# Patient Record
Sex: FEMALE | Race: WHITE | NOT HISPANIC OR LATINO | URBAN - METROPOLITAN AREA
[De-identification: names, ages, dates, MRNs, and addresses within clinical notes are randomized per-mention and may not be internally consistent; named-entity substitution may affect disease eponyms.]

---

## 2018-09-18 PROBLEM — Z00.00 ENCOUNTER FOR PREVENTIVE HEALTH EXAMINATION: Status: ACTIVE | Noted: 2018-09-18

## 2018-11-20 ENCOUNTER — OUTPATIENT (OUTPATIENT)
Dept: OUTPATIENT SERVICES | Facility: HOSPITAL | Age: 58
LOS: 1 days | Discharge: HOME | End: 2018-11-20

## 2018-11-20 DIAGNOSIS — D68.9 COAGULATION DEFECT, UNSPECIFIED: ICD-10-CM

## 2018-11-27 ENCOUNTER — APPOINTMENT (OUTPATIENT)
Dept: HEMATOLOGY ONCOLOGY | Facility: CLINIC | Age: 58
End: 2018-11-27
Payer: COMMERCIAL

## 2018-11-27 DIAGNOSIS — Z80.8 FAMILY HISTORY OF MALIGNANT NEOPLASM OF OTHER ORGANS OR SYSTEMS: ICD-10-CM

## 2018-11-27 DIAGNOSIS — E03.9 HYPOTHYROIDISM, UNSPECIFIED: ICD-10-CM

## 2018-11-27 DIAGNOSIS — L68.0 HIRSUTISM: ICD-10-CM

## 2018-11-27 DIAGNOSIS — G37.9 DEMYELINATING DISEASE OF CENTRAL NERVOUS SYSTEM, UNSPECIFIED: ICD-10-CM

## 2018-11-27 DIAGNOSIS — I63.81 OTHER CEREBRAL INFARCTION DUE TO OCCLUSION OR STENOSIS OF SMALL ARTERY: ICD-10-CM

## 2018-11-27 PROCEDURE — 99243 OFF/OP CNSLTJ NEW/EST LOW 30: CPT

## 2018-12-19 PROBLEM — L68.0 HIRSUTISM: Status: ACTIVE | Noted: 2018-12-19

## 2018-12-19 PROBLEM — G37.9 DEMYELINATING DISEASE: Status: ACTIVE | Noted: 2018-12-19

## 2018-12-19 PROBLEM — Z80.8 FAMILY HISTORY OF MALIGNANT NEOPLASM OF ORAL CAVITY: Status: ACTIVE | Noted: 2018-12-19

## 2018-12-19 PROBLEM — E03.9 HYPOTHYROIDISM: Status: ACTIVE | Noted: 2018-12-19

## 2018-12-19 RX ORDER — MULTIVIT-MIN/IRON/FOLIC ACID/K 18-600-40
500 CAPSULE ORAL
Refills: 0 | Status: ACTIVE | COMMUNITY
Start: 2018-12-19

## 2018-12-19 RX ORDER — MULTIVITAMIN
CAPSULE ORAL
Refills: 0 | Status: ACTIVE | COMMUNITY
Start: 2018-12-19

## 2018-12-19 RX ORDER — B-COMPLEX WITH VITAMIN C
100 TABLET ORAL
Refills: 0 | Status: ACTIVE | COMMUNITY
Start: 2018-12-19

## 2018-12-19 RX ORDER — LORATADINE/PSEUDOEPHEDRINE 5 MG-120MG
10 TABLET, EXTENDED RELEASE 12 HR ORAL
Refills: 0 | Status: ACTIVE | COMMUNITY
Start: 2018-12-19

## 2018-12-19 NOTE — HISTORY OF PRESENT ILLNESS
[de-identified] : Lacunar infarct [de-identified] : 59 YO F with demyelinating disease and a stroke which occurred at least 8  years ago referred for evaluation of possible coagulopathy. Eight years ago, she was found to have demyelinating disease and a lacunar infarct of unknown age was found on MRI. \par \par She reports frequent headaches, more on the left. She is dizzy upon arising, She is fatigued. She has occasional vague chest pain when nervous or with exertion. It lasts 1-5 minutes and can be accompanied by SOB. She notes no nausea, diaphoresis, nor radiation. She has no dyspnea on exertion, limb pain nor swelling, rashes, arthritis, palpitations, TIA's.. \par \par She has no personal nor family history of deep venous thrombophlebitis nor pulmonary embolism. She is not on any anticoagulation.

## 2018-12-19 NOTE — REVIEW OF SYSTEMS
[Negative] : Allergic/Immunologic [Chest Pain] : chest pain [Shortness Of Breath] : shortness of breath [Easy Bruising] : a tendency for easy bruising [Fatigue] : fatigue [Dizziness] : dizziness [de-identified] : HA, right hand tingles

## 2018-12-19 NOTE — ASSESSMENT
[Palliative Care Plan] : not applicable at this time [FreeTextEntry1] : 57 YO F with demyelinating disease and incidentally detected lacunar infarct of unknown age. She is not anticoagulated at this time. She has had no further strokes in the 8 years since the lacunar infarct was discovered.  It is highly  unlikely that an underlying coagulopathy was the etiology of her lacunar infarct.\par \par Plan: \par CBC, PT, APTT, lupus anticoagulant panel, homocysteine, PI-linked antigen, DIC screen\par Call me in 2 weeks\par \par

## 2018-12-19 NOTE — ADDENDUM
[FreeTextEntry1] : Documented by Iraj Ibanez acting as a scribe for Dr. Chris Alexander on 11/27/18\par \par All medical record entries made by the Scribe were at my, Dr. Chris Alexander's, direction and personally dictated by me on 11/27/18. I have reviewed the chart and agree that the record accurately reflects my personal performance of the history, physical exam, procedure and imaging

## 2019-03-20 ENCOUNTER — APPOINTMENT (OUTPATIENT)
Dept: ENDOCRINOLOGY | Facility: CLINIC | Age: 59
End: 2019-03-20
Payer: COMMERCIAL

## 2019-03-20 VITALS
HEART RATE: 74 BPM | SYSTOLIC BLOOD PRESSURE: 122 MMHG | DIASTOLIC BLOOD PRESSURE: 60 MMHG | HEIGHT: 62 IN | BODY MASS INDEX: 23 KG/M2 | WEIGHT: 125 LBS

## 2019-03-20 DIAGNOSIS — E27.0 OTHER ADRENOCORTICAL OVERACTIVITY: ICD-10-CM

## 2019-03-20 DIAGNOSIS — R79.89 OTHER SPECIFIED ABNORMAL FINDINGS OF BLOOD CHEMISTRY: ICD-10-CM

## 2019-03-20 PROCEDURE — 99205 OFFICE O/P NEW HI 60 MIN: CPT

## 2019-03-20 NOTE — HISTORY OF PRESENT ILLNESS
[FreeTextEntry1] : Pt. concerned about thyroid status due to sporadic TSH elevations up to 9 (never treated) and isolated elevated cortisol level of 14 in the afternoon.\par Multiple complaints:\par brain fog, decreased concentration, fatigue, slow thinking, decreased memory, cold intolerance and depression. Present for years, worse lately. CHronic stress (son ill).\par Poor sleeping due to nocturia.\par \par PMH:\par ?lacunar infarct\par demyelinating disease\par anaphylaxis due to ovcon

## 2019-03-20 NOTE — PHYSICAL EXAM
[Well Nourished] : well nourished [Well Developed] : well developed [EOMI] : extra ocular movement intact [Thyroid Not Enlarged] : the thyroid was not enlarged [No Proptosis] : no proptosis [No Thyroid Nodules] : there were no palpable thyroid nodules [No Accessory Muscle Use] : no accessory muscle use [Clear to Auscultation] : lungs were clear to auscultation bilaterally [Normal S1, S2] : normal S1 and S2 [Regular Rhythm] : with a regular rhythm [No Stigmata of Cushings Syndrome] : no stigmata of cushings syndrome [No Clubbing, Cyanosis] : no clubbing  or cyanosis of the fingernails [Normal Strength/Tone] : muscle strength and tone were normal [No Motor Deficits] : the motor exam was normal [No Tremors] : no tremors [Normal Insight/Judgement] : insight and judgment were intact [Normal Affect] : the affect was normal [Normal Mood] : the mood was normal [Recent Memory Normal] : recent memory was not impaired [de-identified] : warm and moist

## 2019-03-20 NOTE — REVIEW OF SYSTEMS
[Fatigue] : fatigue [Blurry Vision] : blurred vision [Chest Pain] : chest pain [Shortness Of Breath] : shortness of breath [Polyuria] : polyuria [Nocturia] : nocturia [Muscle Weakness] : muscle weakness [Myalgia] : myalgia  [Back Pain] : back pain [Hair Loss] : hair loss [Depression] : depression [Dry Skin] : dry skin [Anxiety] : anxiety [Stress] : stress [All other systems negative] : All other systems negative [de-identified] : decreased memory

## 2019-03-20 NOTE — ASSESSMENT
[FreeTextEntry1] : 1. Possible subclinical hypothyroidism, although antibodies negative and no thyroid enlargement on exam. Doubt relation to symptoms, but cannot rule out exacerbation of symptoms by thyroid status. Will repeat thyroid function and consider a therapeutic trial if TSH in the 5-10 range.\par 2. No clinical evidence of Cushing's syndrome. Will check 24 hour urine cortisol. (Pt. uncomfortable about taking dexamethasone in view of prior drug allergies)\par 3. Discussed other factors such as stress and sleep disturbance which are much more likely to cause symptoms. Unable to fully reassure pt. of this. Also discussed psychological testing to rule out cognitive impairment, which is not immediately obvious.

## 2020-02-26 ENCOUNTER — APPOINTMENT (OUTPATIENT)
Dept: OBGYN | Facility: CLINIC | Age: 60
End: 2020-02-26

## 2020-05-08 ENCOUNTER — APPOINTMENT (OUTPATIENT)
Dept: DERMATOLOGY | Facility: CLINIC | Age: 60
End: 2020-05-08
Payer: COMMERCIAL

## 2020-05-08 DIAGNOSIS — B35.1 TINEA UNGUIUM: ICD-10-CM

## 2020-05-08 DIAGNOSIS — L70.0 ACNE VULGARIS: ICD-10-CM

## 2020-05-08 DIAGNOSIS — L73.9 FOLLICULAR DISORDER, UNSPECIFIED: ICD-10-CM

## 2020-05-08 PROCEDURE — 99213 OFFICE O/P EST LOW 20 MIN: CPT | Mod: 95

## 2020-05-08 NOTE — PHYSICAL EXAM
[Alert] : alert [Well Nourished] : well nourished [Oriented x 3] : ~L oriented x 3 [FreeTextEntry3] : Photos examined and patient seen via live interaction over the computer\par \par Face:  rare excoriations on chin\par Buttocks:  rare papules\par Toenails:  mild distal onycholysis, especially the left fifth toenail

## 2020-05-08 NOTE — ASSESSMENT
[FreeTextEntry1] : Acne vulgaris with excoriations on face\par Folliculitis on buttocks\par Onychomycosis on toenails\par \par All under good control

## 2020-10-28 ENCOUNTER — OFFICE VISIT (OUTPATIENT)
Dept: URBAN - METROPOLITAN AREA CLINIC 105 | Facility: CLINIC | Age: 60
End: 2020-10-28
Payer: MEDICARE

## 2020-10-28 DIAGNOSIS — E66.9 OBESITY (BMI 30-39.9): ICD-10-CM

## 2020-10-28 DIAGNOSIS — Z86.010 PERSONAL HISTORY OF COLONIC POLYPS: ICD-10-CM

## 2020-10-28 DIAGNOSIS — D50.0 IRON DEFICIENCY ANEMIA DUE TO CHRONIC BLOOD LOSS: ICD-10-CM

## 2020-10-28 DIAGNOSIS — K55.20 AVM (ARTERIOVENOUS MALFORMATION) OF SMALL BOWEL, ACQUIRED: ICD-10-CM

## 2020-10-28 PROCEDURE — G8417 CALC BMI ABV UP PARAM F/U: HCPCS | Performed by: INTERNAL MEDICINE

## 2020-10-28 PROCEDURE — G8427 DOCREV CUR MEDS BY ELIG CLIN: HCPCS | Performed by: INTERNAL MEDICINE

## 2020-10-28 PROCEDURE — G9903 PT SCRN TBCO ID AS NON USER: HCPCS | Performed by: INTERNAL MEDICINE

## 2020-10-28 PROCEDURE — 99214 OFFICE O/P EST MOD 30 MIN: CPT | Performed by: INTERNAL MEDICINE

## 2020-10-28 PROCEDURE — G8482 FLU IMMUNIZE ORDER/ADMIN: HCPCS | Performed by: INTERNAL MEDICINE

## 2020-10-28 PROCEDURE — 3017F COLORECTAL CA SCREEN DOC REV: CPT | Performed by: INTERNAL MEDICINE

## 2020-10-28 RX ORDER — IBUPROFEN 400 MG/1
TAKE 1 TABLET (400 MG) BY ORAL ROUTE AS NEEDED WITH FOOD TABLET ORAL
Qty: 0 | Refills: 0 | Status: ACTIVE | COMMUNITY
Start: 1900-01-01

## 2020-10-28 RX ORDER — EZETIMIBE 10 MG/1
TAKE 1 TABLET (10 MG) BY ORAL ROUTE ONCE DAILY TABLET ORAL 1
Qty: 0 | Refills: 0 | Status: ACTIVE | COMMUNITY
Start: 1900-01-01

## 2020-10-28 RX ORDER — PROMETHAZINE HYDROCHLORIDE 25 MG/1
AS NEED FOR NAUSEA TABLET ORAL
Qty: 0 | Refills: 0 | Status: ACTIVE | COMMUNITY
Start: 1900-01-01

## 2020-10-28 RX ORDER — FLUOXETINE 20 MG/1
TAKE 1 CAPSULE (20 MG) BY ORAL ROUTE ONCE DAILY CAPSULE ORAL 1
Qty: 0 | Refills: 0 | Status: ACTIVE | COMMUNITY
Start: 1900-01-01

## 2020-10-28 RX ORDER — ANASTROZOLE 1 MG/1
TAKE 1 TABLET (1 MG) BY ORAL ROUTE ONCE DAILY TABLET ORAL 1
Qty: 0 | Refills: 0 | Status: ACTIVE | COMMUNITY
Start: 1900-01-01

## 2020-10-28 RX ORDER — QUETIAPINE FUMARATE 25 MG/1
AS NEEDED TABLET, FILM COATED ORAL
Qty: 0 | Refills: 0 | Status: ACTIVE | COMMUNITY
Start: 1900-01-01

## 2020-10-28 RX ORDER — PANTOPRAZOLE SODIUM 40 MG/1
TAKE 1 TABLET (40 MG) BY ORAL ROUTE ONCE DAILY TABLET, DELAYED RELEASE ORAL 1
Qty: 0 | Refills: 0 | Status: ACTIVE | COMMUNITY
Start: 1900-01-01

## 2020-10-28 RX ORDER — ACETAMINOPHEN 325 MG/1
TABLET, FILM COATED ORAL
Qty: 0 | Refills: 0 | Status: ACTIVE | COMMUNITY
Start: 1900-01-01

## 2020-10-28 RX ORDER — OMEPRAZOLE 40 MG/1
TAKE 1 CAPSULE (40 MG) BY ORAL ROUTE ONCE DAILY BEFORE A MEAL CAPSULE, DELAYED RELEASE PELLETS ORAL 1
Qty: 0 | Refills: 0 | Status: ACTIVE | COMMUNITY
Start: 1900-01-01

## 2020-10-28 RX ORDER — MELOXICAM 15 MG/1
TAKE 1 TABLET (15 MG) BY ORAL ROUTE ONCE DAILY TABLET ORAL 1
Qty: 0 | Refills: 0 | Status: ACTIVE | COMMUNITY
Start: 1900-01-01

## 2020-10-28 RX ORDER — METFORMIN HYDROCHLORIDE 500 MG/1
TAKE 1 TABLET (500 MG) BY ORAL ROUTE 1 TIME PER DAY WITH MORNING AND EVENING MEALS TABLET, COATED ORAL 2
Qty: 0 | Refills: 0 | Status: ACTIVE | COMMUNITY
Start: 1900-01-01

## 2020-10-28 NOTE — HPI-TODAY'S VISIT:
The patient is an /White female who presents on follow-up for iron deficiency anemia.  On 8/14/19, the patient stated that while she was having chemo for breast cancer, her hemoglobin dropped and she had two iron infusions. Her last infusion was in February. She finished chemo in March and her oncologist Dr. Rodriguez had referred her because of her iron deficiency anemia/drop in H/H. She denied seeing blood in her BMs. She denied NSAID uses and she was not currently taking oral iron.   She had previously been followed by Dr. Guzman and by Dr. Castro. She underwent an EGD/small bowel capsule study in 2015 and was noted to have small bowel AVMs.  She had had previous colonoscopies in 2013 and 2010. Iron supplementation was recommended at that time.  I did not have any records from Dr. Rodriguez to review today.  Today, she says she had an episode of diarrhea after eating a blueberry salad with walnuts and parmesan. She thought she saw blood in her stool, but it was actually a piece of blueberry. She is off iron. She is on Meloxicam QOD. She had recent labwork with her PCP.  Labs 8/14/19 - Iron sat 13%, ferritin 693. CBC normal. 3/5/18 - CBC normal, Iron sat 10%, Ferritin 308.

## 2020-10-29 ENCOUNTER — TELEPHONE ENCOUNTER (OUTPATIENT)
Dept: URBAN - METROPOLITAN AREA CLINIC 92 | Facility: CLINIC | Age: 60
End: 2020-10-29

## 2020-10-29 LAB
BASO (ABSOLUTE): 0
BASOS: 0
EOS (ABSOLUTE): 0.2
EOS: 3
FERRITIN, SERUM: 537
HEMATOCRIT: 34.2
HEMATOLOGY COMMENTS:: (no result)
HEMOGLOBIN: 11
IMMATURE CELLS: (no result)
IMMATURE GRANS (ABS): 0.1
IMMATURE GRANULOCYTES: 1
IRON BIND.CAP.(TIBC): 256
IRON SATURATION: 12
IRON: 31
LYMPHS (ABSOLUTE): 2.1
LYMPHS: 30
MCH: 28.5
MCHC: 32.2
MCV: 89
MONOCYTES(ABSOLUTE): 0.6
MONOCYTES: 8
NEUTROPHILS (ABSOLUTE): 4.1
NEUTROPHILS: 58
NRBC: (no result)
PLATELETS: 286
RBC: 3.86
RDW: 13.2
UIBC: 225
WBC: 7.1

## 2020-10-29 RX ORDER — FERROUS GLUCONATE 324 MG
1 TABLET WITH WATER OR JUICE BETWEEN MEALS TABLET ORAL ONCE A DAY
Qty: 90 TABLET | Refills: 0 | OUTPATIENT
Start: 2020-10-29

## 2021-02-03 ENCOUNTER — OFFICE VISIT (OUTPATIENT)
Dept: URBAN - METROPOLITAN AREA CLINIC 105 | Facility: CLINIC | Age: 61
End: 2021-02-03
Payer: MEDICARE

## 2021-02-03 DIAGNOSIS — E66.9 OBESITY (BMI 30-39.9): ICD-10-CM

## 2021-02-03 DIAGNOSIS — Z86.010 PERSONAL HISTORY OF COLONIC POLYPS: ICD-10-CM

## 2021-02-03 DIAGNOSIS — K55.20 AVM (ARTERIOVENOUS MALFORMATION) OF SMALL BOWEL, ACQUIRED: ICD-10-CM

## 2021-02-03 DIAGNOSIS — D50.0 IRON DEFICIENCY ANEMIA DUE TO CHRONIC BLOOD LOSS: ICD-10-CM

## 2021-02-03 PROCEDURE — G9903 PT SCRN TBCO ID AS NON USER: HCPCS | Performed by: INTERNAL MEDICINE

## 2021-02-03 PROCEDURE — G8482 FLU IMMUNIZE ORDER/ADMIN: HCPCS | Performed by: INTERNAL MEDICINE

## 2021-02-03 PROCEDURE — 99214 OFFICE O/P EST MOD 30 MIN: CPT | Performed by: INTERNAL MEDICINE

## 2021-02-03 PROCEDURE — G8427 DOCREV CUR MEDS BY ELIG CLIN: HCPCS | Performed by: INTERNAL MEDICINE

## 2021-02-03 PROCEDURE — 3017F COLORECTAL CA SCREEN DOC REV: CPT | Performed by: INTERNAL MEDICINE

## 2021-02-03 PROCEDURE — G8417 CALC BMI ABV UP PARAM F/U: HCPCS | Performed by: INTERNAL MEDICINE

## 2021-02-03 RX ORDER — PROMETHAZINE HYDROCHLORIDE 25 MG/1
AS NEED FOR NAUSEA TABLET ORAL
Qty: 0 | Refills: 0 | Status: ACTIVE | COMMUNITY
Start: 1900-01-01

## 2021-02-03 RX ORDER — MELOXICAM 15 MG/1
TAKE 1 TABLET (15 MG) BY ORAL ROUTE ONCE DAILY TABLET ORAL 1
Qty: 0 | Refills: 0 | Status: ACTIVE | COMMUNITY
Start: 1900-01-01

## 2021-02-03 RX ORDER — METFORMIN HYDROCHLORIDE 500 MG/1
TAKE 1 TABLET (500 MG) BY ORAL ROUTE 1 TIME PER DAY WITH MORNING AND EVENING MEALS TABLET, COATED ORAL 2
Qty: 0 | Refills: 0 | Status: ACTIVE | COMMUNITY
Start: 1900-01-01

## 2021-02-03 RX ORDER — FERROUS GLUCONATE 324 MG
1 TABLET WITH WATER OR JUICE BETWEEN MEALS TABLET ORAL ONCE A DAY
Qty: 90 TABLET | Refills: 0 | Status: ACTIVE | COMMUNITY
Start: 2020-10-29

## 2021-02-03 RX ORDER — IBUPROFEN 400 MG/1
TAKE 1 TABLET (400 MG) BY ORAL ROUTE AS NEEDED WITH FOOD TABLET ORAL
Qty: 0 | Refills: 0 | Status: ACTIVE | COMMUNITY
Start: 1900-01-01

## 2021-02-03 RX ORDER — OMEPRAZOLE 40 MG/1
TAKE 1 CAPSULE (40 MG) BY ORAL ROUTE ONCE DAILY BEFORE A MEAL CAPSULE, DELAYED RELEASE PELLETS ORAL 1
Qty: 0 | Refills: 0 | Status: DISCONTINUED | COMMUNITY
Start: 1900-01-01

## 2021-02-03 RX ORDER — ACETAMINOPHEN 325 MG/1
TABLET, FILM COATED ORAL
Qty: 0 | Refills: 0 | Status: ACTIVE | COMMUNITY
Start: 1900-01-01

## 2021-02-03 RX ORDER — PANTOPRAZOLE SODIUM 40 MG/1
TAKE 1 TABLET (40 MG) BY ORAL ROUTE ONCE DAILY TABLET, DELAYED RELEASE ORAL 1
Qty: 0 | Refills: 0 | Status: ACTIVE | COMMUNITY
Start: 1900-01-01

## 2021-02-03 RX ORDER — QUETIAPINE FUMARATE 25 MG/1
AS NEEDED TABLET, FILM COATED ORAL
Qty: 0 | Refills: 0 | Status: ACTIVE | COMMUNITY
Start: 1900-01-01

## 2021-02-03 RX ORDER — AMLODIPINE BESYLATE 10 MG/1
1 TABLET TABLET ORAL ONCE A DAY
Status: ACTIVE | COMMUNITY

## 2021-02-03 RX ORDER — EZETIMIBE 10 MG/1
TAKE 1 TABLET (10 MG) BY ORAL ROUTE ONCE DAILY TABLET ORAL 1
Qty: 0 | Refills: 0 | Status: ACTIVE | COMMUNITY
Start: 1900-01-01

## 2021-02-03 RX ORDER — FERROUS GLUCONATE 324 MG
1 TABLET WITH WATER OR JUICE BETWEEN MEALS TABLET ORAL ONCE A DAY
Qty: 90 TABLET | Refills: 3
Start: 2020-10-29

## 2021-02-03 RX ORDER — ANASTROZOLE 1 MG/1
TAKE 1 TABLET (1 MG) BY ORAL ROUTE ONCE DAILY TABLET ORAL 1
Qty: 0 | Refills: 0 | Status: ACTIVE | COMMUNITY
Start: 1900-01-01

## 2021-02-03 RX ORDER — FLUOXETINE 20 MG/1
TAKE 1 CAPSULE (20 MG) BY ORAL ROUTE ONCE DAILY CAPSULE ORAL 1
Qty: 0 | Refills: 0 | Status: ACTIVE | COMMUNITY
Start: 1900-01-01

## 2021-02-03 NOTE — HPI-TODAY'S VISIT:
The patient is an /White female who presents on follow-up for iron deficiency anemia.  On 8/14/19, the patient stated that while she was having chemo for breast cancer, her hemoglobin dropped and she had two iron infusions. Her last infusion was in February. She finished chemo in March and her oncologist Dr. Rodriguez had referred her because of her iron deficiency anemia/drop in H/H. She denied seeing blood in her BMs. She denied NSAID uses and she was not currently taking oral iron.   She had previously been followed by Dr. Guzman and by Dr. Castro. She underwent an EGD/small bowel capsule study in 2015 and was noted to have small bowel AVMs.  She had had previous colonoscopies in 2013 and 2010. Iron supplementation was recommended at that time.  I did not have any records from Dr. Rodriguez to review today.  On 10/28/20, she said she had an episode of diarrhea after eating a blueberry salad with walnuts and parmesan. She thought she saw blood in her stool, but it was actually a piece of blueberry. She was off iron. She was on Meloxicam QOD. She had recent labwork with her PCP.  Today, she says she started on iron 1 pill QD. She denies bleeding.  No complaints.  Labs 10/28/20 - CBC normal except hgb 11. Iron sat 12%, ferritin 537. 8/14/19 - Iron sat 13%, ferritin 693. CBC normal. 3/5/18 - CBC normal, Iron sat 10%, Ferritin 308.

## 2021-02-04 LAB
BASO (ABSOLUTE): 0
BASOS: 0
EOS (ABSOLUTE): 0.2
EOS: 3
FERRITIN, SERUM: 547
HEMATOCRIT: 35.5
HEMATOLOGY COMMENTS:: (no result)
HEMOGLOBIN: 11.7
IMMATURE CELLS: (no result)
IMMATURE GRANS (ABS): 0
IMMATURE GRANULOCYTES: 1
IRON BIND.CAP.(TIBC): 258
IRON SATURATION: 22
IRON: 57
LYMPHS (ABSOLUTE): 2.5
LYMPHS: 39
MCH: 29.2
MCHC: 33
MCV: 89
MONOCYTES(ABSOLUTE): 0.4
MONOCYTES: 7
NEUTROPHILS (ABSOLUTE): 3.2
NEUTROPHILS: 50
NRBC: (no result)
PLATELETS: 270
RBC: 4.01
RDW: 13.6
UIBC: 201
WBC: 6.3

## 2021-08-03 ENCOUNTER — OFFICE VISIT (OUTPATIENT)
Dept: URBAN - METROPOLITAN AREA CLINIC 105 | Facility: CLINIC | Age: 61
End: 2021-08-03
Payer: MEDICARE

## 2021-08-03 VITALS
BODY MASS INDEX: 41.47 KG/M2 | DIASTOLIC BLOOD PRESSURE: 88 MMHG | WEIGHT: 280 LBS | TEMPERATURE: 97.6 F | SYSTOLIC BLOOD PRESSURE: 154 MMHG | HEIGHT: 69 IN | HEART RATE: 101 BPM

## 2021-08-03 DIAGNOSIS — Z86.010 PERSONAL HISTORY OF COLONIC POLYPS: ICD-10-CM

## 2021-08-03 DIAGNOSIS — K59.09 OTHER CONSTIPATION: ICD-10-CM

## 2021-08-03 DIAGNOSIS — K55.20 AVM (ARTERIOVENOUS MALFORMATION) OF SMALL BOWEL, ACQUIRED: ICD-10-CM

## 2021-08-03 DIAGNOSIS — D50.8 OTHER IRON DEFICIENCY ANEMIAS: ICD-10-CM

## 2021-08-03 PROCEDURE — 99214 OFFICE O/P EST MOD 30 MIN: CPT | Performed by: INTERNAL MEDICINE

## 2021-08-03 RX ORDER — EZETIMIBE 10 MG/1
TAKE 1 TABLET (10 MG) BY ORAL ROUTE ONCE DAILY TABLET ORAL 1
Qty: 0 | Refills: 0 | Status: ACTIVE | COMMUNITY
Start: 1900-01-01

## 2021-08-03 RX ORDER — AMLODIPINE BESYLATE 10 MG/1
1 TABLET TABLET ORAL ONCE A DAY
Status: ACTIVE | COMMUNITY

## 2021-08-03 RX ORDER — FERROUS GLUCONATE 324 MG
1 TABLET WITH WATER OR JUICE BETWEEN MEALS TABLET ORAL ONCE A DAY
Qty: 90 TABLET | Refills: 3 | Status: ACTIVE | COMMUNITY
Start: 2020-10-29

## 2021-08-03 RX ORDER — IBUPROFEN 400 MG/1
TAKE 1 TABLET (400 MG) BY ORAL ROUTE AS NEEDED WITH FOOD TABLET ORAL
Qty: 0 | Refills: 0 | Status: ACTIVE | COMMUNITY
Start: 1900-01-01

## 2021-08-03 RX ORDER — QUETIAPINE FUMARATE 25 MG/1
AS NEEDED TABLET, FILM COATED ORAL
Qty: 0 | Refills: 0 | Status: ACTIVE | COMMUNITY
Start: 1900-01-01

## 2021-08-03 RX ORDER — PANTOPRAZOLE SODIUM 40 MG/1
TAKE 1 TABLET (40 MG) BY ORAL ROUTE ONCE DAILY TABLET, DELAYED RELEASE ORAL 1
Qty: 0 | Refills: 0 | Status: ACTIVE | COMMUNITY
Start: 1900-01-01

## 2021-08-03 RX ORDER — PROMETHAZINE HYDROCHLORIDE 25 MG/1
AS NEED FOR NAUSEA TABLET ORAL
Qty: 0 | Refills: 0 | Status: ACTIVE | COMMUNITY
Start: 1900-01-01

## 2021-08-03 RX ORDER — MELOXICAM 15 MG/1
TAKE 1 TABLET (15 MG) BY ORAL ROUTE ONCE DAILY TABLET ORAL 1
Qty: 0 | Refills: 0 | Status: ACTIVE | COMMUNITY
Start: 1900-01-01

## 2021-08-03 RX ORDER — ACETAMINOPHEN 325 MG/1
TABLET, FILM COATED ORAL
Qty: 0 | Refills: 0 | Status: ACTIVE | COMMUNITY
Start: 1900-01-01

## 2021-08-03 RX ORDER — ANASTROZOLE 1 MG/1
TAKE 1 TABLET (1 MG) BY ORAL ROUTE ONCE DAILY TABLET ORAL 1
Qty: 0 | Refills: 0 | Status: ACTIVE | COMMUNITY
Start: 1900-01-01

## 2021-08-03 RX ORDER — FLUOXETINE 20 MG/1
TAKE 1 CAPSULE (20 MG) BY ORAL ROUTE ONCE DAILY CAPSULE ORAL 1
Qty: 0 | Refills: 0 | Status: ACTIVE | COMMUNITY
Start: 1900-01-01

## 2021-08-03 RX ORDER — METFORMIN HYDROCHLORIDE 500 MG/1
TAKE 1 TABLET (500 MG) BY ORAL ROUTE 1 TIME PER DAY WITH MORNING AND EVENING MEALS TABLET, COATED ORAL 2
Qty: 0 | Refills: 0 | Status: ACTIVE | COMMUNITY
Start: 1900-01-01

## 2021-08-03 NOTE — HPI-TODAY'S VISIT:
The patient is an /White female who presents on follow-up for iron deficiency anemia.  On 8/14/19, the patient stated that while she was having chemo for breast cancer, her hemoglobin dropped and she had two iron infusions. Her last infusion was in February. She finished chemo in March and her oncologist Dr. Rodriguez had referred her because of her iron deficiency anemia/drop in H/H. She denied seeing blood in her BMs. She denied NSAID uses and she was not currently taking oral iron.   She had previously been followed by Dr. Guzman and by Dr. Castro. She underwent an EGD/small bowel capsule study in 2015 and was noted to have small bowel AVMs.  She had had previous colonoscopies in 2013 and 2010. Iron supplementation was recommended at that time.  I did not have any records from Dr. Rodriguez to review today.  On 10/28/20, she said she had an episode of diarrhea after eating a blueberry salad with walnuts and parmesan. She thought she saw blood in her stool, but it was actually a piece of blueberry. She was off iron. She was on Meloxicam QOD. She had recent labwork with her PCP.  On 2/3/21, she said she started on iron 1 pill QD. She denied bleeding. No complaints.  Today, she says she continues on iron 1 pill QD with associated constipation occasionally ("pebble" stools). She takes pantoprazole/3-4 days. She takes Meloxicam 1-2x/week.  Labs 2/3/21 - Ferritin 547. CBC, iron/TIBC all normal. 10/28/20 - CBC normal except hgb 11. Iron sat 12%, ferritin 537. 8/14/19 - Iron sat 13%, ferritin 693. CBC normal. 3/5/18 - CBC normal, Iron sat 10%, Ferritin 308.

## 2021-08-06 LAB
BASO (ABSOLUTE): 0
BASOS: 0
EOS (ABSOLUTE): 0.2
EOS: 3
FERRITIN, SERUM: 492
HEMATOCRIT: 35.7
HEMATOLOGY COMMENTS:: (no result)
HEMOGLOBIN: 11.5
IMMATURE CELLS: (no result)
IMMATURE GRANS (ABS): 0
IMMATURE GRANULOCYTES: 1
IRON BIND.CAP.(TIBC): 258
IRON SATURATION: 14
IRON: 35
LYMPHS (ABSOLUTE): 2.3
LYMPHS: 30
MCH: 29.2
MCHC: 32.2
MCV: 91
MONOCYTES(ABSOLUTE): 0.5
MONOCYTES: 6
NEUTROPHILS (ABSOLUTE): 4.6
NEUTROPHILS: 60
NRBC: (no result)
PLATELETS: 266
RBC: 3.94
RDW: 13.1
UIBC: 223
WBC: 7.6

## 2021-10-26 ENCOUNTER — NON-APPOINTMENT (OUTPATIENT)
Age: 61
End: 2021-10-26

## 2021-11-06 ENCOUNTER — NON-APPOINTMENT (OUTPATIENT)
Age: 61
End: 2021-11-06

## 2022-02-03 ENCOUNTER — OFFICE VISIT (OUTPATIENT)
Dept: URBAN - METROPOLITAN AREA CLINIC 105 | Facility: CLINIC | Age: 62
End: 2022-02-03
Payer: MEDICARE

## 2022-02-03 VITALS
WEIGHT: 278.6 LBS | TEMPERATURE: 97 F | HEIGHT: 69 IN | DIASTOLIC BLOOD PRESSURE: 85 MMHG | HEART RATE: 106 BPM | SYSTOLIC BLOOD PRESSURE: 143 MMHG | BODY MASS INDEX: 41.26 KG/M2

## 2022-02-03 DIAGNOSIS — K59.03 DRUG-INDUCED CONSTIPATION: ICD-10-CM

## 2022-02-03 DIAGNOSIS — K55.20 AVM (ARTERIOVENOUS MALFORMATION) OF SMALL BOWEL, ACQUIRED: ICD-10-CM

## 2022-02-03 DIAGNOSIS — D50.0 IRON DEFICIENCY ANEMIA DUE TO CHRONIC BLOOD LOSS: ICD-10-CM

## 2022-02-03 DIAGNOSIS — Z86.010 PERSONAL HISTORY OF COLONIC POLYPS: ICD-10-CM

## 2022-02-03 DIAGNOSIS — E66.9 OBESITY (BMI 30-39.9): ICD-10-CM

## 2022-02-03 DIAGNOSIS — K21.9 GASTROESOPHAGEAL REFLUX DISEASE WITHOUT ESOPHAGITIS: ICD-10-CM

## 2022-02-03 PROCEDURE — 99214 OFFICE O/P EST MOD 30 MIN: CPT | Performed by: INTERNAL MEDICINE

## 2022-02-03 RX ORDER — MELOXICAM 15 MG/1
TAKE 1 TABLET (15 MG) BY ORAL ROUTE ONCE DAILY TABLET ORAL 1
Qty: 0 | Refills: 0 | Status: ACTIVE | COMMUNITY
Start: 1900-01-01

## 2022-02-03 RX ORDER — FERROUS GLUCONATE 324 MG
1 TABLET WITH WATER OR JUICE BETWEEN MEALS TABLET ORAL ONCE A DAY
Qty: 90 | Refills: 3
Start: 2020-10-29

## 2022-02-03 RX ORDER — AMLODIPINE BESYLATE 10 MG/1
1 TABLET TABLET ORAL ONCE A DAY
Status: ACTIVE | COMMUNITY

## 2022-02-03 RX ORDER — ANASTROZOLE 1 MG/1
TAKE 1 TABLET (1 MG) BY ORAL ROUTE ONCE DAILY TABLET ORAL 1
Qty: 0 | Refills: 0 | Status: ACTIVE | COMMUNITY
Start: 1900-01-01

## 2022-02-03 RX ORDER — QUETIAPINE FUMARATE 25 MG/1
AS NEEDED TABLET, FILM COATED ORAL
Qty: 0 | Refills: 0 | Status: ACTIVE | COMMUNITY
Start: 1900-01-01

## 2022-02-03 RX ORDER — METFORMIN HYDROCHLORIDE 500 MG/1
TAKE 1 TABLET (500 MG) BY ORAL ROUTE 1 TIME PER DAY WITH MORNING AND EVENING MEALS TABLET, COATED ORAL 2
Qty: 0 | Refills: 0 | Status: ACTIVE | COMMUNITY
Start: 1900-01-01

## 2022-02-03 RX ORDER — EZETIMIBE 10 MG/1
TAKE 1 TABLET (10 MG) BY ORAL ROUTE ONCE DAILY TABLET ORAL 1
Qty: 0 | Refills: 0 | Status: ACTIVE | COMMUNITY
Start: 1900-01-01

## 2022-02-03 RX ORDER — PANTOPRAZOLE SODIUM 40 MG/1
TAKE 1 TABLET TABLET, DELAYED RELEASE ORAL
Qty: 90 | Refills: 3

## 2022-02-03 RX ORDER — PROMETHAZINE HYDROCHLORIDE 25 MG/1
AS NEED FOR NAUSEA TABLET ORAL
Qty: 0 | Refills: 0 | Status: ACTIVE | COMMUNITY
Start: 1900-01-01

## 2022-02-03 RX ORDER — IBUPROFEN 400 MG/1
TAKE 1 TABLET (400 MG) BY ORAL ROUTE AS NEEDED WITH FOOD TABLET ORAL
Qty: 0 | Refills: 0 | Status: ON HOLD | COMMUNITY
Start: 1900-01-01

## 2022-02-03 RX ORDER — FERROUS GLUCONATE 324 MG
1 TABLET WITH WATER OR JUICE BETWEEN MEALS TABLET ORAL ONCE A DAY
Qty: 90 TABLET | Refills: 3 | Status: ACTIVE | COMMUNITY
Start: 2020-10-29

## 2022-02-03 RX ORDER — ACETAMINOPHEN 325 MG/1
TABLET, FILM COATED ORAL
Qty: 0 | Refills: 0 | Status: ACTIVE | COMMUNITY
Start: 1900-01-01

## 2022-02-03 RX ORDER — FLUOXETINE 20 MG/1
TAKE 1 CAPSULE (20 MG) BY ORAL ROUTE ONCE DAILY CAPSULE ORAL 1
Qty: 0 | Refills: 0 | Status: ACTIVE | COMMUNITY
Start: 1900-01-01

## 2022-02-03 RX ORDER — PANTOPRAZOLE SODIUM 40 MG/1
TAKE 1 TABLET (40 MG) BY ORAL ROUTE ONCE DAILY TABLET, DELAYED RELEASE ORAL 1
Qty: 0 | Refills: 0 | Status: ACTIVE | COMMUNITY
Start: 1900-01-01

## 2022-02-03 NOTE — HPI-TODAY'S VISIT:
The patient is an /White female who presents on follow-up for iron deficiency anemia.  On 8/14/19, the patient stated that while she was having chemo for breast cancer, her hemoglobin dropped and she had two iron infusions. Her last infusion was in February. She finished chemo in March and her oncologist Dr. Rodriguez had referred her because of her iron deficiency anemia/drop in H/H. She denied seeing blood in her BMs. She denied NSAID uses and she was not currently taking oral iron.   She had previously been followed by Dr. Guzman and by Dr. Castro. She underwent an EGD/small bowel capsule study in 2015 and was noted to have small bowel AVMs.  She had had previous colonoscopies in 2013 and 2010. Iron supplementation was recommended at that time.  I did not have any records from Dr. Rodriguez to review today.  On 10/28/20, she said she had an episode of diarrhea after eating a blueberry salad with walnuts and parmesan. She thought she saw blood in her stool, but it was actually a piece of blueberry. She was off iron. She was on Meloxicam QOD. She had recent labwork with her PCP.  On 2/3/21, she said she started on iron 1 pill QD. She denied bleeding. No complaints.  On 8/3/21, she said she continued on iron 1 pill QD with associated constipation occasionally ("pebble" stools). She took pantoprazole/3-4 days. She took Meloxicam 1-2x/week.  Today, she says she continues on iron 1 pill QD. She is off pantoprazole and notes heartburn. She takes Miralax 1 cap QD - she has 1 BM QD that could be soft or hard. She takes Meloxicam QOD when needed.  Labs 8/5/21 - Iron sat 14%, TIBC 258, UIBC 223, iron 35, ferritin 492. CBC normal (hgb 11.5). 2/3/21 - Ferritin 547. CBC, iron/TIBC all normal. 10/28/20 - CBC normal except hgb 11. Iron sat 12%, ferritin 537. 8/14/19 - Iron sat 13%, ferritin 693. CBC normal. 3/5/18 - CBC normal, Iron sat 10%, Ferritin 308.

## 2022-02-04 LAB
BASO (ABSOLUTE): 0
BASOS: 0
EOS (ABSOLUTE): 0.2
EOS: 2
FERRITIN, SERUM: 694
HEMATOCRIT: 35.9
HEMATOLOGY COMMENTS:: (no result)
HEMOGLOBIN: 11.9
IMMATURE CELLS: (no result)
IMMATURE GRANS (ABS): 0
IMMATURE GRANULOCYTES: 1
IRON BIND.CAP.(TIBC): 265
IRON SATURATION: 18
IRON: 47
LYMPHS (ABSOLUTE): 2.3
LYMPHS: 29
MCH: 29.2
MCHC: 33.1
MCV: 88
MONOCYTES(ABSOLUTE): 0.5
MONOCYTES: 6
NEUTROPHILS (ABSOLUTE): 4.9
NEUTROPHILS: 62
NRBC: (no result)
PLATELETS: 287
RBC: 4.07
RDW: 13.1
UIBC: 218
WBC: 7.9

## 2022-02-22 ENCOUNTER — OUT OF OFFICE VISIT (OUTPATIENT)
Dept: URBAN - METROPOLITAN AREA MEDICAL CENTER 33 | Facility: MEDICAL CENTER | Age: 62
End: 2022-02-22
Payer: MEDICARE

## 2022-02-22 DIAGNOSIS — K59.09 CHANGE IN BOWEL MOVEMENTS INTERMITTENT CONSTIPATION. URGENCY IN THE MORNING.: ICD-10-CM

## 2022-02-22 DIAGNOSIS — R93.3 ABN FINDINGS-GI TRACT: ICD-10-CM

## 2022-02-22 DIAGNOSIS — D64.89 ANEMIA DUE TO OTHER CAUSE: ICD-10-CM

## 2022-02-22 DIAGNOSIS — K92.1 ACUTE MELENA: ICD-10-CM

## 2022-02-22 PROCEDURE — G8427 DOCREV CUR MEDS BY ELIG CLIN: HCPCS | Performed by: PHYSICIAN ASSISTANT

## 2022-02-22 PROCEDURE — 99233 SBSQ HOSP IP/OBS HIGH 50: CPT | Performed by: PHYSICIAN ASSISTANT

## 2022-02-22 PROCEDURE — 99223 1ST HOSP IP/OBS HIGH 75: CPT | Performed by: PHYSICIAN ASSISTANT

## 2022-03-03 ENCOUNTER — WEB ENCOUNTER (OUTPATIENT)
Dept: URBAN - METROPOLITAN AREA CLINIC 105 | Facility: CLINIC | Age: 62
End: 2022-03-03

## 2022-03-03 ENCOUNTER — OFFICE VISIT (OUTPATIENT)
Dept: URBAN - METROPOLITAN AREA CLINIC 105 | Facility: CLINIC | Age: 62
End: 2022-03-03
Payer: MEDICARE

## 2022-03-03 VITALS
BODY MASS INDEX: 40.4 KG/M2 | SYSTOLIC BLOOD PRESSURE: 138 MMHG | TEMPERATURE: 97.2 F | WEIGHT: 272.8 LBS | HEART RATE: 89 BPM | DIASTOLIC BLOOD PRESSURE: 84 MMHG | HEIGHT: 69 IN

## 2022-03-03 DIAGNOSIS — R11.0 NAUSEA: ICD-10-CM

## 2022-03-03 DIAGNOSIS — E66.9 OBESITY (BMI 30-39.9): ICD-10-CM

## 2022-03-03 DIAGNOSIS — D50.0 IRON DEFICIENCY ANEMIA DUE TO CHRONIC BLOOD LOSS: ICD-10-CM

## 2022-03-03 DIAGNOSIS — A09 INFECTIOUS COLITIS: ICD-10-CM

## 2022-03-03 DIAGNOSIS — Z86.010 PERSONAL HISTORY OF COLONIC POLYPS: ICD-10-CM

## 2022-03-03 DIAGNOSIS — K21.9 GASTROESOPHAGEAL REFLUX DISEASE WITHOUT ESOPHAGITIS: ICD-10-CM

## 2022-03-03 DIAGNOSIS — K59.03 DRUG-INDUCED CONSTIPATION: ICD-10-CM

## 2022-03-03 DIAGNOSIS — K55.20 AVM (ARTERIOVENOUS MALFORMATION) OF SMALL BOWEL, ACQUIRED: ICD-10-CM

## 2022-03-03 PROCEDURE — 99214 OFFICE O/P EST MOD 30 MIN: CPT | Performed by: INTERNAL MEDICINE

## 2022-03-03 RX ORDER — ACETAMINOPHEN 325 MG/1
TABLET, FILM COATED ORAL
Qty: 0 | Refills: 0 | Status: ACTIVE | COMMUNITY
Start: 1900-01-01

## 2022-03-03 RX ORDER — AMLODIPINE BESYLATE 10 MG/1
1 TABLET TABLET ORAL ONCE A DAY
Status: ACTIVE | COMMUNITY

## 2022-03-03 RX ORDER — MELOXICAM 15 MG/1
TAKE 1 TABLET (15 MG) BY ORAL ROUTE ONCE DAILY TABLET ORAL 1
Qty: 0 | Refills: 0 | Status: ACTIVE | COMMUNITY
Start: 1900-01-01

## 2022-03-03 RX ORDER — FERROUS GLUCONATE 324 MG
1 TABLET WITH WATER OR JUICE BETWEEN MEALS TABLET ORAL ONCE A DAY
Qty: 90 | Refills: 3 | Status: ACTIVE | COMMUNITY
Start: 2020-10-29

## 2022-03-03 RX ORDER — METFORMIN HYDROCHLORIDE 500 MG/1
TAKE 1 TABLET (500 MG) BY ORAL ROUTE 1 TIME PER DAY WITH MORNING AND EVENING MEALS TABLET, COATED ORAL 2
Qty: 0 | Refills: 0 | Status: ACTIVE | COMMUNITY
Start: 1900-01-01

## 2022-03-03 RX ORDER — QUETIAPINE FUMARATE 25 MG/1
AS NEEDED TABLET, FILM COATED ORAL
Qty: 0 | Refills: 0 | Status: ACTIVE | COMMUNITY
Start: 1900-01-01

## 2022-03-03 RX ORDER — PROMETHAZINE HYDROCHLORIDE 25 MG/1
AS NEED FOR NAUSEA TABLET ORAL
Qty: 0 | Refills: 0 | Status: ACTIVE | COMMUNITY
Start: 1900-01-01

## 2022-03-03 RX ORDER — ONDANSETRON 4 MG/1
1 TABLET ON THE TONGUE AND ALLOW TO DISSOLVE TABLET, ORALLY DISINTEGRATING ORAL
Qty: 30 | Refills: 2 | OUTPATIENT
Start: 2022-03-03

## 2022-03-03 RX ORDER — PANTOPRAZOLE SODIUM 40 MG/1
TAKE 1 TABLET TABLET, DELAYED RELEASE ORAL
Qty: 90 | Refills: 3 | Status: ACTIVE | COMMUNITY

## 2022-03-03 RX ORDER — FLUOXETINE 20 MG/1
TAKE 1 CAPSULE (20 MG) BY ORAL ROUTE ONCE DAILY CAPSULE ORAL 1
Qty: 0 | Refills: 0 | Status: ACTIVE | COMMUNITY
Start: 1900-01-01

## 2022-03-03 RX ORDER — ANASTROZOLE 1 MG/1
TAKE 1 TABLET (1 MG) BY ORAL ROUTE ONCE DAILY TABLET ORAL 1
Qty: 0 | Refills: 0 | Status: ACTIVE | COMMUNITY
Start: 1900-01-01

## 2022-03-03 RX ORDER — IBUPROFEN 400 MG/1
TAKE 1 TABLET (400 MG) BY ORAL ROUTE AS NEEDED WITH FOOD TABLET ORAL
Qty: 0 | Refills: 0 | Status: ON HOLD | COMMUNITY
Start: 1900-01-01

## 2022-03-03 RX ORDER — EZETIMIBE 10 MG/1
TAKE 1 TABLET (10 MG) BY ORAL ROUTE ONCE DAILY TABLET ORAL 1
Qty: 0 | Refills: 0 | Status: ACTIVE | COMMUNITY
Start: 1900-01-01

## 2022-03-03 NOTE — HPI-TODAY'S VISIT:
PAST MEDICAL HISTORY:  The patient is an /White female who presents on follow-up for iron deficiency anemia.  On 8/14/19, the patient stated that while she was having chemo for breast cancer, her hemoglobin dropped and she had two iron infusions. Her last infusion was in February. She finished chemo in March and her oncologist Dr. Rodriguez had referred her because of her iron deficiency anemia/drop in H/H. She denied seeing blood in her BMs. She denied NSAID uses and she was not currently taking oral iron.   She had previously been followed by Dr. Guzman and by Dr. Castro. She underwent an EGD/small bowel capsule study in 2015 and was noted to have small bowel AVMs.  She had had previous colonoscopies in 2013 and 2010. Iron supplementation was recommended at that time.  I did not have any records from Dr. Rodriguez to review today.  On 10/28/20, she said she had an episode of diarrhea after eating a blueberry salad with walnuts and parmesan. She thought she saw blood in her stool, but it was actually a piece of blueberry. She was off iron. She was on Meloxicam QOD. She had recent labwork with her PCP.  On 2/3/21, she said she started on iron 1 pill QD. She denied bleeding. No complaints.  On 8/3/21, she said she continued on iron 1 pill QD with associated constipation occasionally ("pebble" stools). She took pantoprazole/3-4 days. She took Meloxicam 1-2x/week.  On 2/3/22, she said she continued on iron 1 pill QD. She was off pantoprazole and noted heartburn. She took Miralax 1 cap QD - she had 1 BM QD that could be soft or hard. She took Meloxicam QOD when needed.  HPI:  Patient seen in hospital follow-up after an inpatient stay at [facility] from 2/22-2/24/22 for infectious colitis.  Tx'ed with Cipro/Flagyl.  Today, she says she finished abx treatment for infectious colitis. She currently has a little bit of nausea without emesis. She is eating small meals. She has 2-3 soft/watery BMs QD without blood.  Labs 2/24/22 - CBC, CMP all normal. 2/3/22 - Ferritin 694, TIBC 265, UIBC 218, iron 47, iron sat 18%. CBC normal (hgb 11.9). 8/5/21 - Iron sat 14%, TIBC 258, UIBC 223, iron 35, ferritin 492. CBC normal (hgb 11.5). 2/3/21 - Ferritin 547. CBC, iron/TIBC all normal. 10/28/20 - CBC normal except hgb 11. Iron sat 12%, ferritin 537. 8/14/19 - Iron sat 13%, ferritin 693. CBC normal. 3/5/18 - CBC normal, Iron sat 10%, Ferritin 308.

## 2022-03-10 ENCOUNTER — NON-APPOINTMENT (OUTPATIENT)
Age: 62
End: 2022-03-10

## 2022-03-30 ENCOUNTER — OFFICE VISIT (OUTPATIENT)
Dept: URBAN - METROPOLITAN AREA CLINIC 105 | Facility: CLINIC | Age: 62
End: 2022-03-30

## 2022-04-12 ENCOUNTER — OFFICE VISIT (OUTPATIENT)
Dept: URBAN - METROPOLITAN AREA CLINIC 105 | Facility: CLINIC | Age: 62
End: 2022-04-12
Payer: MEDICARE

## 2022-04-12 VITALS
HEIGHT: 69 IN | WEIGHT: 277 LBS | BODY MASS INDEX: 41.03 KG/M2 | DIASTOLIC BLOOD PRESSURE: 86 MMHG | SYSTOLIC BLOOD PRESSURE: 139 MMHG | TEMPERATURE: 96.8 F | HEART RATE: 109 BPM

## 2022-04-12 DIAGNOSIS — K21.9 GASTROESOPHAGEAL REFLUX DISEASE WITHOUT ESOPHAGITIS: ICD-10-CM

## 2022-04-12 DIAGNOSIS — K59.03 DRUG-INDUCED CONSTIPATION: ICD-10-CM

## 2022-04-12 DIAGNOSIS — E66.9 OBESITY (BMI 30-39.9): ICD-10-CM

## 2022-04-12 DIAGNOSIS — Z86.010 PERSONAL HISTORY OF COLONIC POLYPS: ICD-10-CM

## 2022-04-12 DIAGNOSIS — K55.20 AVM (ARTERIOVENOUS MALFORMATION) OF SMALL BOWEL, ACQUIRED: ICD-10-CM

## 2022-04-12 DIAGNOSIS — R11.0 NAUSEA: ICD-10-CM

## 2022-04-12 DIAGNOSIS — A09 INFECTIOUS COLITIS: ICD-10-CM

## 2022-04-12 DIAGNOSIS — D50.0 IRON DEFICIENCY ANEMIA DUE TO CHRONIC BLOOD LOSS: ICD-10-CM

## 2022-04-12 PROCEDURE — 99213 OFFICE O/P EST LOW 20 MIN: CPT | Performed by: INTERNAL MEDICINE

## 2022-04-12 RX ORDER — ACETAMINOPHEN 325 MG/1
TABLET, FILM COATED ORAL
Qty: 0 | Refills: 0 | Status: ACTIVE | COMMUNITY
Start: 1900-01-01

## 2022-04-12 RX ORDER — MELOXICAM 15 MG/1
TAKE 1 TABLET (15 MG) BY ORAL ROUTE ONCE DAILY TABLET ORAL 1
Qty: 0 | Refills: 0 | Status: ACTIVE | COMMUNITY
Start: 1900-01-01

## 2022-04-12 RX ORDER — FERROUS GLUCONATE 324 MG
1 TABLET WITH WATER OR JUICE BETWEEN MEALS TABLET ORAL ONCE A DAY
Qty: 90 | Refills: 3 | Status: ACTIVE | COMMUNITY
Start: 2020-10-29

## 2022-04-12 RX ORDER — EZETIMIBE 10 MG/1
TAKE 1 TABLET (10 MG) BY ORAL ROUTE ONCE DAILY TABLET ORAL 1
Qty: 0 | Refills: 0 | Status: ACTIVE | COMMUNITY
Start: 1900-01-01

## 2022-04-12 RX ORDER — FLUOXETINE 20 MG/1
TAKE 1 CAPSULE (20 MG) BY ORAL ROUTE ONCE DAILY CAPSULE ORAL 1
Qty: 0 | Refills: 0 | Status: ACTIVE | COMMUNITY
Start: 1900-01-01

## 2022-04-12 RX ORDER — ONDANSETRON 4 MG/1
1 TABLET ON THE TONGUE AND ALLOW TO DISSOLVE TABLET, ORALLY DISINTEGRATING ORAL
Qty: 30 | Refills: 2 | Status: ACTIVE | COMMUNITY
Start: 2022-03-03

## 2022-04-12 RX ORDER — ANASTROZOLE 1 MG/1
TAKE 1 TABLET (1 MG) BY ORAL ROUTE ONCE DAILY TABLET ORAL 1
Qty: 0 | Refills: 0 | Status: ACTIVE | COMMUNITY
Start: 1900-01-01

## 2022-04-12 RX ORDER — QUETIAPINE FUMARATE 25 MG/1
AS NEEDED TABLET, FILM COATED ORAL
Qty: 0 | Refills: 0 | Status: ACTIVE | COMMUNITY
Start: 1900-01-01

## 2022-04-12 RX ORDER — PROMETHAZINE HYDROCHLORIDE 25 MG/1
AS NEED FOR NAUSEA TABLET ORAL
Qty: 0 | Refills: 0 | Status: ACTIVE | COMMUNITY
Start: 1900-01-01

## 2022-04-12 RX ORDER — METFORMIN HYDROCHLORIDE 500 MG/1
TAKE 1 TABLET (500 MG) BY ORAL ROUTE 1 TIME PER DAY WITH MORNING AND EVENING MEALS TABLET, COATED ORAL 2
Qty: 0 | Refills: 0 | Status: ACTIVE | COMMUNITY
Start: 1900-01-01

## 2022-04-12 RX ORDER — AMLODIPINE BESYLATE 10 MG/1
1 TABLET TABLET ORAL ONCE A DAY
Status: ACTIVE | COMMUNITY

## 2022-04-12 RX ORDER — PANTOPRAZOLE SODIUM 40 MG/1
TAKE 1 TABLET TABLET, DELAYED RELEASE ORAL
Qty: 90 | Refills: 3 | Status: ACTIVE | COMMUNITY

## 2022-04-12 NOTE — HPI-TODAY'S VISIT:
The patient is an /White female who presents on follow-up for iron deficiency anemia.  PAST MEDICAL HISTORY: On 8/14/19, the patient stated that while she was having chemo for breast cancer, her hemoglobin dropped and she had two iron infusions. Her last infusion was in February. She finished chemo in March and her oncologist Dr. Rodriguez had referred her because of her iron deficiency anemia/drop in H/H. She denied seeing blood in her BMs. She denied NSAID uses and she was not currently taking oral iron.   She had previously been followed by Dr. Guzman and by Dr. Castro. She underwent an EGD/small bowel capsule study in 2015 and was noted to have small bowel AVMs.  She had had previous colonoscopies in 2013 and 2010. Iron supplementation was recommended at that time.  I did not have any records from Dr. Rodriguez to review today.  On 10/28/20, she said she had an episode of diarrhea after eating a blueberry salad with walnuts and parmesan. She thought she saw blood in her stool, but it was actually a piece of blueberry. She was off iron. She was on Meloxicam QOD. She had recent labwork with her PCP.  On 2/3/21, she said she started on iron 1 pill QD. She denied bleeding. No complaints.  On 8/3/21, she said she continued on iron 1 pill QD with associated constipation occasionally ("pebble" stools). She took pantoprazole/3-4 days. She took Meloxicam 1-2x/week.  On 2/3/22, she said she continued on iron 1 pill QD. She was off pantoprazole and noted heartburn. She took Miralax 1 cap QD - she had 1 BM QD that could be soft or hard. She took Meloxicam QOD when needed.  On 3/3/22, patient seen in hospital follow-up after an inpatient stay at [facility] from 2/22-2/24/22 for infectious colitis.  Tx'ed with Cipro/Flagyl.  Today, she said she finished abx treatment for infectious colitis. She currently had a little bit of nausea without emesis. She was eating small meals. She had 2-3 soft/watery BMs QD without blood.  HPI:   Today, she says her last BM was Sunday - stool was not hard and did not strain. She is not using Miralax currently. Nausea is occasional with a meal. Zofran quickly relieves her nausea she states - takes it rarely. She is off iron. [facility]  Dr. Ho Garcia at [place].  Labs 2/24/22 - CBC, CMP all normal. 2/3/22 - Ferritin 694, TIBC 265, UIBC 218, iron 47, iron sat 18%. CBC normal (hgb 11.9). 8/5/21 - Iron sat 14%, TIBC 258, UIBC 223, iron 35, ferritin 492. CBC normal (hgb 11.5). 2/3/21 - Ferritin 547. CBC, iron/TIBC all normal. 10/28/20 - CBC normal except hgb 11. Iron sat 12%, ferritin 537. 8/14/19 - Iron sat 13%, ferritin 693. CBC normal. 3/5/18 - CBC normal, Iron sat 10%, Ferritin 308.

## 2022-07-12 ENCOUNTER — OFFICE VISIT (OUTPATIENT)
Dept: URBAN - METROPOLITAN AREA CLINIC 105 | Facility: CLINIC | Age: 62
End: 2022-07-12
Payer: MEDICARE

## 2022-07-12 VITALS
HEART RATE: 127 BPM | DIASTOLIC BLOOD PRESSURE: 79 MMHG | HEIGHT: 69 IN | TEMPERATURE: 97.3 F | BODY MASS INDEX: 41.12 KG/M2 | SYSTOLIC BLOOD PRESSURE: 119 MMHG | WEIGHT: 277.6 LBS

## 2022-07-12 DIAGNOSIS — K21.9 GASTROESOPHAGEAL REFLUX DISEASE WITHOUT ESOPHAGITIS: ICD-10-CM

## 2022-07-12 DIAGNOSIS — K59.03 DRUG-INDUCED CONSTIPATION: ICD-10-CM

## 2022-07-12 DIAGNOSIS — E66.9 OBESITY (BMI 30-39.9): ICD-10-CM

## 2022-07-12 DIAGNOSIS — Z86.010 PERSONAL HISTORY OF COLONIC POLYPS: ICD-10-CM

## 2022-07-12 DIAGNOSIS — A09 INFECTIOUS COLITIS: ICD-10-CM

## 2022-07-12 DIAGNOSIS — D50.0 IRON DEFICIENCY ANEMIA DUE TO CHRONIC BLOOD LOSS: ICD-10-CM

## 2022-07-12 DIAGNOSIS — R11.0 NAUSEA: ICD-10-CM

## 2022-07-12 DIAGNOSIS — K55.20 AVM (ARTERIOVENOUS MALFORMATION) OF SMALL BOWEL, ACQUIRED: ICD-10-CM

## 2022-07-12 PROCEDURE — 99214 OFFICE O/P EST MOD 30 MIN: CPT | Performed by: INTERNAL MEDICINE

## 2022-07-12 RX ORDER — PROMETHAZINE HYDROCHLORIDE 25 MG/1
AS NEED FOR NAUSEA TABLET ORAL
Qty: 0 | Refills: 0 | Status: ACTIVE | COMMUNITY
Start: 1900-01-01

## 2022-07-12 RX ORDER — METFORMIN HYDROCHLORIDE 500 MG/1
TAKE 1 TABLET (500 MG) BY ORAL ROUTE 1 TIME PER DAY WITH MORNING AND EVENING MEALS TABLET, COATED ORAL 2
Qty: 0 | Refills: 0 | Status: ACTIVE | COMMUNITY
Start: 1900-01-01

## 2022-07-12 RX ORDER — QUETIAPINE FUMARATE 25 MG/1
AS NEEDED TABLET, FILM COATED ORAL
Qty: 0 | Refills: 0 | Status: ACTIVE | COMMUNITY
Start: 1900-01-01

## 2022-07-12 RX ORDER — ANASTROZOLE 1 MG/1
TAKE 1 TABLET (1 MG) BY ORAL ROUTE ONCE DAILY TABLET ORAL 1
Qty: 0 | Refills: 0 | Status: ACTIVE | COMMUNITY
Start: 1900-01-01

## 2022-07-12 RX ORDER — ONDANSETRON 4 MG/1
1 TABLET ON THE TONGUE AND ALLOW TO DISSOLVE TABLET, ORALLY DISINTEGRATING ORAL
Qty: 30 | Refills: 2 | Status: ACTIVE | COMMUNITY
Start: 2022-03-03

## 2022-07-12 RX ORDER — AMLODIPINE BESYLATE 10 MG/1
1 TABLET TABLET ORAL ONCE A DAY
Status: ACTIVE | COMMUNITY

## 2022-07-12 RX ORDER — FERROUS GLUCONATE 324 MG
1 TABLET WITH WATER OR JUICE BETWEEN MEALS TABLET ORAL ONCE A DAY
Qty: 90 | Refills: 3 | Status: ON HOLD | COMMUNITY
Start: 2020-10-29

## 2022-07-12 RX ORDER — ACETAMINOPHEN 325 MG/1
TABLET, FILM COATED ORAL
Qty: 0 | Refills: 0 | Status: ACTIVE | COMMUNITY
Start: 1900-01-01

## 2022-07-12 RX ORDER — MELOXICAM 15 MG/1
TAKE 1 TABLET (15 MG) BY ORAL ROUTE ONCE DAILY TABLET ORAL 1
Qty: 0 | Refills: 0 | Status: ACTIVE | COMMUNITY
Start: 1900-01-01

## 2022-07-12 RX ORDER — FLUOXETINE 20 MG/1
TAKE 1 CAPSULE (20 MG) BY ORAL ROUTE ONCE DAILY CAPSULE ORAL 1
Qty: 0 | Refills: 0 | Status: ACTIVE | COMMUNITY
Start: 1900-01-01

## 2022-07-12 RX ORDER — PANTOPRAZOLE SODIUM 40 MG/1
TAKE 1 TABLET TABLET, DELAYED RELEASE ORAL
Qty: 90 | Refills: 3 | Status: ACTIVE | COMMUNITY

## 2022-07-12 RX ORDER — EZETIMIBE 10 MG/1
TAKE 1 TABLET (10 MG) BY ORAL ROUTE ONCE DAILY TABLET ORAL 1
Qty: 0 | Refills: 0 | Status: ACTIVE | COMMUNITY
Start: 1900-01-01

## 2022-07-12 NOTE — PHYSICAL EXAM CHEST:
breathing is unlabored without accessory muscle use , chest wall non-tender, breathing is unlabored without accessory muscle use, normal breath sounds

## 2022-07-12 NOTE — PHYSICAL EXAM GASTROINTESTINAL
Abdomen non distended , Abdomen , soft, nontender, nondistended , no guarding or rigidity , no masses palpable , normal bowel sounds , Liver and Spleen,  no hepatosplenomegaly , liver nontender

## 2022-07-12 NOTE — HPI-TODAY'S VISIT:
The patient is an /White female who presents on follow-up for iron deficiency anemia.  PAST MEDICAL HISTORY: On 8/14/19, the patient stated that while she was having chemo for breast cancer, her hemoglobin dropped and she had two iron infusions. Her last infusion was in February. She finished chemo in March and her oncologist Dr. Rodriguez had referred her because of her iron deficiency anemia/drop in H/H. She denied seeing blood in her BMs. She denied NSAID uses and she was not currently taking oral iron.   She had previously been followed by Dr. Guzman and by Dr. Castro. She underwent an EGD/small bowel capsule study in 2015 and was noted to have small bowel AVMs.  She had had previous colonoscopies in 2013 and 2010. Iron supplementation was recommended at that time.  I did not have any records from Dr. Rodriguez to review today.  On 10/28/20, she said she had an episode of diarrhea after eating a blueberry salad with walnuts and parmesan. She thought she saw blood in her stool, but it was actually a piece of blueberry. She was off iron. She was on Meloxicam QOD. She had recent labwork with her PCP.  On 2/3/21, she said she started on iron 1 pill QD. She denied bleeding. No complaints.  On 8/3/21, she said she continued on iron 1 pill QD with associated constipation occasionally ("pebble" stools). She took pantoprazole/3-4 days. She took Meloxicam 1-2x/week.  On 2/3/22, she said she continued on iron 1 pill QD. She was off pantoprazole and noted heartburn. She took Miralax 1 cap QD - she had 1 BM QD that could be soft or hard. She took Meloxicam QOD when needed.  On 3/3/22, patient seen in hospital follow-up after an inpatient stay at [facility] from 2/22-2/24/22 for infectious colitis.  Tx'ed with Cipro/Flagyl.  Today, she said she finished abx treatment for infectious colitis. She currently had a little bit of nausea without emesis. She was eating small meals. She had 2-3 soft/watery BMs QD without blood.  On 4/12/22, she said her last BM was [place] - stool was not hard and did not strain. She was not using Miralax currently. Nausea was occasional with a meal. Zofran quickly relieved her nausea she stated - took it rarely. She was off iron. [facility]  Dr. Ho Garcia at [place].  HPI: Today, she says she has resumed Miralax, taking 1 packet QD. She has a daily soft BM. She continues on pantoprazole 40 mg QD - no heartburn. Nausea is only w/ certain foods she states. She is off iron.   Her and her  had COVID last month - only symptoms were an intermittent headache and sweats. She took an antiviral medication for 5 days.  Labs 2/24/22 - CBC, CMP all normal. 2/3/22 - Ferritin 694, TIBC 265, UIBC 218, iron 47, iron sat 18%. CBC normal (hgb 11.9). 8/5/21 - Iron sat 14%, TIBC 258, UIBC 223, iron 35, ferritin 492. CBC normal (hgb 11.5). 2/3/21 - Ferritin 547. CBC, iron/TIBC all normal. 10/28/20 - CBC normal except hgb 11. Iron sat 12%, ferritin 537. 8/14/19 - Iron sat 13%, ferritin 693. CBC normal. 3/5/18 - CBC normal, Iron sat 10%, Ferritin 308.

## 2022-07-13 LAB
BASO (ABSOLUTE): 0
BASOS: 0
EOS (ABSOLUTE): 0.2
EOS: 3
FERRITIN, SERUM: 460
HEMATOCRIT: 34.7
HEMATOLOGY COMMENTS:: (no result)
HEMOGLOBIN: 11.1
IMMATURE CELLS: (no result)
IMMATURE GRANS (ABS): 0
IMMATURE GRANULOCYTES: 0
IRON BIND.CAP.(TIBC): 263
IRON SATURATION: 17
IRON: 44
LYMPHS (ABSOLUTE): 2.1
LYMPHS: 32
MCH: 28.4
MCHC: 32
MCV: 89
MONOCYTES(ABSOLUTE): 0.3
MONOCYTES: 4
NEUTROPHILS (ABSOLUTE): 4
NEUTROPHILS: 61
NRBC: (no result)
PLATELETS: 243
RBC: 3.91
RDW: 13.5
UIBC: 219
WBC: 6.7

## 2022-07-25 ENCOUNTER — ERX REFILL RESPONSE (OUTPATIENT)
Dept: URBAN - METROPOLITAN AREA CLINIC 105 | Facility: CLINIC | Age: 62
End: 2022-07-25

## 2022-07-25 RX ORDER — ONDANSETRON 4 MG/1
DISSOLVE 1 TABLET ON THE TONGUE EVERY 6 HOURS AS NEEDED FOR NAUSEA TABLET, ORALLY DISINTEGRATING ORAL
Qty: 30 TABLET | Refills: 5 | OUTPATIENT

## 2022-07-25 RX ORDER — ONDANSETRON 4 MG/1
1 TABLET ON THE TONGUE AND ALLOW TO DISSOLVE TABLET, ORALLY DISINTEGRATING ORAL
Qty: 30 | Refills: 2 | OUTPATIENT

## 2022-08-02 ENCOUNTER — OFFICE VISIT (OUTPATIENT)
Dept: URBAN - METROPOLITAN AREA CLINIC 105 | Facility: CLINIC | Age: 62
End: 2022-08-02

## 2022-08-06 ENCOUNTER — TRANSCRIPTION ENCOUNTER (OUTPATIENT)
Age: 62
End: 2022-08-06

## 2022-08-31 ENCOUNTER — TELEPHONE ENCOUNTER (OUTPATIENT)
Dept: URBAN - METROPOLITAN AREA CLINIC 105 | Facility: CLINIC | Age: 62
End: 2022-08-31

## 2022-12-22 ENCOUNTER — TELEPHONE ENCOUNTER (OUTPATIENT)
Dept: URBAN - METROPOLITAN AREA CLINIC 105 | Facility: CLINIC | Age: 62
End: 2022-12-22

## 2022-12-22 RX ORDER — ONDANSETRON 4 MG/1
1 TABLET ON THE TONGUE AND ALLOW TO DISSOLVE TABLET, ORALLY DISINTEGRATING ORAL
Qty: 30 | Refills: 11 | OUTPATIENT

## 2022-12-23 ENCOUNTER — TELEPHONE ENCOUNTER (OUTPATIENT)
Dept: URBAN - METROPOLITAN AREA CLINIC 105 | Facility: CLINIC | Age: 62
End: 2022-12-23

## 2023-01-04 ENCOUNTER — OFFICE VISIT (OUTPATIENT)
Dept: URBAN - METROPOLITAN AREA CLINIC 105 | Facility: CLINIC | Age: 63
End: 2023-01-04
Payer: MEDICARE

## 2023-01-04 VITALS
TEMPERATURE: 96.8 F | SYSTOLIC BLOOD PRESSURE: 145 MMHG | HEART RATE: 123 BPM | DIASTOLIC BLOOD PRESSURE: 83 MMHG | HEIGHT: 69 IN | BODY MASS INDEX: 40.88 KG/M2 | WEIGHT: 276 LBS

## 2023-01-04 DIAGNOSIS — D50.0 IRON DEFICIENCY ANEMIA DUE TO CHRONIC BLOOD LOSS: ICD-10-CM

## 2023-01-04 DIAGNOSIS — A09 INFECTIOUS COLITIS: ICD-10-CM

## 2023-01-04 DIAGNOSIS — K59.03 DRUG-INDUCED CONSTIPATION: ICD-10-CM

## 2023-01-04 DIAGNOSIS — K21.9 GASTROESOPHAGEAL REFLUX DISEASE WITHOUT ESOPHAGITIS: ICD-10-CM

## 2023-01-04 DIAGNOSIS — Z86.010 PERSONAL HISTORY OF COLONIC POLYPS: ICD-10-CM

## 2023-01-04 DIAGNOSIS — R11.0 NAUSEA: ICD-10-CM

## 2023-01-04 DIAGNOSIS — K55.20 AVM (ARTERIOVENOUS MALFORMATION) OF SMALL BOWEL, ACQUIRED: ICD-10-CM

## 2023-01-04 DIAGNOSIS — E66.9 OBESITY (BMI 30-39.9): ICD-10-CM

## 2023-01-04 PROBLEM — 414916001 OBESITY: Status: ACTIVE | Noted: 2020-10-28

## 2023-01-04 PROBLEM — 428283002: Status: ACTIVE | Noted: 2020-10-31

## 2023-01-04 PROCEDURE — 99214 OFFICE O/P EST MOD 30 MIN: CPT | Performed by: INTERNAL MEDICINE

## 2023-01-04 RX ORDER — ONDANSETRON 4 MG/1
1 TABLET ON THE TONGUE AND ALLOW TO DISSOLVE TABLET, ORALLY DISINTEGRATING ORAL
Qty: 30 | Refills: 11 | Status: ACTIVE | COMMUNITY

## 2023-01-04 RX ORDER — ACETAMINOPHEN 325 MG/1
TABLET, FILM COATED ORAL
Qty: 0 | Refills: 0 | Status: ACTIVE | COMMUNITY
Start: 1900-01-01

## 2023-01-04 RX ORDER — PANTOPRAZOLE SODIUM 40 MG/1
TAKE 1 TABLET TABLET, DELAYED RELEASE ORAL
Qty: 90 | Refills: 3 | Status: ACTIVE | COMMUNITY

## 2023-01-04 RX ORDER — ANASTROZOLE 1 MG/1
TAKE 1 TABLET (1 MG) BY ORAL ROUTE ONCE DAILY TABLET ORAL 1
Qty: 0 | Refills: 0 | Status: ACTIVE | COMMUNITY
Start: 1900-01-01

## 2023-01-04 RX ORDER — QUETIAPINE FUMARATE 25 MG/1
AS NEEDED TABLET, FILM COATED ORAL
Qty: 0 | Refills: 0 | Status: ACTIVE | COMMUNITY
Start: 1900-01-01

## 2023-01-04 RX ORDER — PANTOPRAZOLE SODIUM 40 MG/1
TAKE 1 TABLET TABLET, DELAYED RELEASE ORAL
Qty: 90 | Refills: 3 | OUTPATIENT

## 2023-01-04 RX ORDER — EZETIMIBE 10 MG/1
TAKE 1 TABLET (10 MG) BY ORAL ROUTE ONCE DAILY TABLET ORAL 1
Qty: 0 | Refills: 0 | Status: ACTIVE | COMMUNITY
Start: 1900-01-01

## 2023-01-04 RX ORDER — METFORMIN HYDROCHLORIDE 500 MG/1
TAKE 1 TABLET (500 MG) BY ORAL ROUTE 1 TIME PER DAY WITH MORNING AND EVENING MEALS TABLET, COATED ORAL 2
Qty: 0 | Refills: 0 | Status: ACTIVE | COMMUNITY
Start: 1900-01-01

## 2023-01-04 RX ORDER — MELOXICAM 15 MG/1
TAKE 1 TABLET (15 MG) BY ORAL ROUTE ONCE DAILY TABLET ORAL 1
Qty: 0 | Refills: 0 | Status: ACTIVE | COMMUNITY
Start: 1900-01-01

## 2023-01-04 RX ORDER — AMLODIPINE BESYLATE 10 MG/1
1 TABLET TABLET ORAL ONCE A DAY
Status: ACTIVE | COMMUNITY

## 2023-01-04 RX ORDER — FLUOXETINE 20 MG/1
TAKE 1 CAPSULE (20 MG) BY ORAL ROUTE ONCE DAILY CAPSULE ORAL 1
Qty: 0 | Refills: 0 | Status: ACTIVE | COMMUNITY
Start: 1900-01-01

## 2023-01-04 RX ORDER — FERROUS GLUCONATE 324 MG
1 TABLET WITH WATER OR JUICE BETWEEN MEALS TABLET ORAL ONCE A DAY
Qty: 90 | Refills: 3 | Status: ON HOLD | COMMUNITY
Start: 2020-10-29

## 2023-01-04 NOTE — HPI-TODAY'S VISIT:
The patient is an /White female who presents on follow-up for iron deficiency anemia.  PAST MEDICAL HISTORY: On 8/14/19, the patient stated that while she was having chemo for breast cancer, her hemoglobin dropped and she had two iron infusions. Her last infusion was in February. She finished chemo in March and her oncologist Dr. Rodriguez had referred her because of her iron deficiency anemia/drop in H/H. She denied seeing blood in her BMs. She denied NSAID uses and she was not currently taking oral iron.   She had previously been followed by Dr. Guzman and by Dr. Castro. She underwent an EGD/small bowel capsule study in 2015 and was noted to have small bowel AVMs.  She had had previous colonoscopies in 2013 and 2010. Iron supplementation was recommended at that time.  I did not have any records from Dr. Rodriguez to review today.  On 10/28/20, she said she had an episode of diarrhea after eating a blueberry salad with walnuts and parmesan. She thought she saw blood in her stool, but it was actually a piece of blueberry. She was off iron. She was on Meloxicam QOD. She had recent labwork with her PCP.  On 2/3/21, she said she started on iron 1 pill QD. She denied bleeding. No complaints.  On 8/3/21, she said she continued on iron 1 pill QD with associated constipation occasionally ("pebble" stools). She took pantoprazole/3-4 days. She took Meloxicam 1-2x/week.  On 2/3/22, she said she continued on iron 1 pill QD. She was off pantoprazole and noted heartburn. She took Miralax 1 cap QD - she had 1 BM QD that could be soft or hard. She took Meloxicam QOD when needed.  On 3/3/22, patient seen in hospital follow-up after an inpatient stay at [facility] from 2/22-2/24/22 for infectious colitis.  Tx'ed with Cipro/Flagyl.  Today, she said she finished abx treatment for infectious colitis. She currently had a little bit of nausea without emesis. She was eating small meals. She had 2-3 soft/watery BMs QD without blood.  On 4/12/22, she said her last BM was Yaakov - stool was not hard and did not strain. She was not using Miralax currently. Nausea was occasional with a meal. Zofran quickly relieved her nausea she stated - took it rarely. She was off iron. [facility]  Dr. Ho Garcia at [place]. On 7/12/22, she said she had resumed Miralax, taking 1 packet QD. She had a daily soft BM. She continued on pantoprazole 40 mg QD - no heartburn. Nausea was only w/ certain foods she stated. She was off iron.   Her and her  had COVID last month - only symptoms were an intermittent headache and sweats. She took an antiviral medication for 5 days.  HPI: Today, she says she only has nausea with certain meats like pork and fried chicken, so she has made diet changes. Ondansetron works. She is not using promethazine. She can have pebbly BMs. She is using Miralax 1 cap every Monday, Wednesday, and Friday. She continues on pantoprazole 40 mg QD.  Labs 7/12/22 - TIBC 263, UIBC 219, iron 44, iron sat 17%, ferritin 460. CBC normal (hgb 11.1). 2/24/22 - CBC, CMP all normal. 2/3/22 - Ferritin 694, TIBC 265, UIBC 218, iron 47, iron sat 18%. CBC normal (hgb 11.9). 8/5/21 - Iron sat 14%, TIBC 258, UIBC 223, iron 35, ferritin 492. CBC normal (hgb 11.5). 2/3/21 - Ferritin 547. CBC, iron/TIBC all normal. 10/28/20 - CBC normal except hgb 11. Iron sat 12%, ferritin 537. 8/14/19 - Iron sat 13%, ferritin 693. CBC normal. 3/5/18 - CBC normal, Iron sat 10%, Ferritin 308.

## 2023-01-05 LAB
ABSOLUTE BASOPHILS: 19
ABSOLUTE EOSINOPHILS: 211
ABSOLUTE LYMPHOCYTES: 2021
ABSOLUTE MONOCYTES: 391
ABSOLUTE NEUTROPHILS: 3559
BASOPHILS: 0.3
EOSINOPHILS: 3.4
FERRITIN, SERUM: 308
HEMATOCRIT: 33.8
HEMOGLOBIN: 11.3
IRON BIND.CAP.(TIBC): 281
IRON SATURATION: 19
IRON: 54
LYMPHOCYTES: 32.6
MCH: 29.6
MCHC: 33.4
MCV: 88.5
MONOCYTES: 6.3
MPV: 10.8
NEUTROPHILS: 57.4
PLATELET COUNT: 260
RDW: 13.3
RED BLOOD CELL COUNT: 3.82
WHITE BLOOD CELL COUNT: 6.2

## 2023-01-25 ENCOUNTER — ERX REFILL RESPONSE (OUTPATIENT)
Dept: URBAN - METROPOLITAN AREA CLINIC 105 | Facility: CLINIC | Age: 63
End: 2023-01-25

## 2023-01-25 RX ORDER — ONDANSETRON 4 MG/1
1 TABLET ON THE TONGUE AND ALLOW TO DISSOLVE TABLET, ORALLY DISINTEGRATING ORAL
Qty: 30 | Refills: 11 | OUTPATIENT

## 2023-02-23 ENCOUNTER — APPOINTMENT (OUTPATIENT)
Dept: DERMATOLOGY | Facility: CLINIC | Age: 63
End: 2023-02-23
Payer: COMMERCIAL

## 2023-02-23 DIAGNOSIS — D22.5 MELANOCYTIC NEVI OF TRUNK: ICD-10-CM

## 2023-02-23 PROCEDURE — 17000 DESTRUCT PREMALG LESION: CPT

## 2023-02-23 PROCEDURE — 99212 OFFICE O/P EST SF 10 MIN: CPT | Mod: 25

## 2023-02-23 RX ORDER — CICLOPIROX OLAMINE 7.7 MG/G
0.77 CREAM TOPICAL
Qty: 1 | Refills: 3 | Status: ACTIVE | COMMUNITY
Start: 2020-05-08 | End: 1900-01-01

## 2023-02-23 RX ORDER — ERYTHROMYCIN 20 MG/ML
2 SOLUTION TOPICAL
Qty: 1 | Refills: 5 | Status: ACTIVE | COMMUNITY
Start: 2020-05-08 | End: 1900-01-01

## 2023-02-23 NOTE — HISTORY OF PRESENT ILLNESS
[FreeTextEntry1] : Evaluation of growths [de-identified] : Follow-up visit for 62-year-old white female last seen by me via telehealth on May 8, 2020, with a 4-month history of an enlarging lesion on the left side of the nose.\par No history of skin cancer.  Patient has history of "precancer".

## 2023-02-23 NOTE — PHYSICAL EXAM
[Alert] : alert [Oriented x 3] : ~L oriented x 3 [Well Nourished] : well nourished [FreeTextEntry3] : The following areas were examined and no significant abnormalities were seen except as noted below:\par \par Type II skin\par \par scalp, face, eyelids, nose, lips, ears, neck, chest, abdomen, back, buttocks, right arm, left arm, right hand, left hand,\par right  leg, left leg, right foot, left foot\par Breast and groin exams offered and declined by patient.\par \par Left upper nose: 7 x 5 mm seen slightly scaly faint pink patch\par Trunk: Rare small brown macules\par \par No other suspicious lesions seen

## 2023-05-08 ENCOUNTER — APPOINTMENT (OUTPATIENT)
Dept: DERMATOLOGY | Facility: CLINIC | Age: 63
End: 2023-05-08
Payer: COMMERCIAL

## 2023-05-08 PROCEDURE — 17000 DESTRUCT PREMALG LESION: CPT

## 2023-05-08 NOTE — HISTORY OF PRESENT ILLNESS
[FreeTextEntry1] : Growth on nose [de-identified] : Follow-up visit for 62-year-old white female last seen by me on February 23, 2023, presenting with a lesion on the upper nose.\par

## 2023-05-08 NOTE — PHYSICAL EXAM
[Alert] : alert [Oriented x 3] : ~L oriented x 3 [Well Nourished] : well nourished [FreeTextEntry3] : Upper nose: 3 x 3 mm slightly eroded erythematous macule

## 2023-06-13 ENCOUNTER — OFFICE VISIT (OUTPATIENT)
Dept: URBAN - METROPOLITAN AREA CLINIC 105 | Facility: CLINIC | Age: 63
End: 2023-06-13

## 2023-06-20 ENCOUNTER — OFFICE VISIT (OUTPATIENT)
Dept: URBAN - METROPOLITAN AREA CLINIC 105 | Facility: CLINIC | Age: 63
End: 2023-06-20
Payer: MEDICARE

## 2023-06-20 VITALS
HEIGHT: 69 IN | DIASTOLIC BLOOD PRESSURE: 85 MMHG | HEART RATE: 108 BPM | SYSTOLIC BLOOD PRESSURE: 139 MMHG | TEMPERATURE: 97 F | BODY MASS INDEX: 40.32 KG/M2 | WEIGHT: 272.2 LBS

## 2023-06-20 DIAGNOSIS — D50.0 IRON DEFICIENCY ANEMIA DUE TO CHRONIC BLOOD LOSS: ICD-10-CM

## 2023-06-20 DIAGNOSIS — R11.0 NAUSEA: ICD-10-CM

## 2023-06-20 DIAGNOSIS — A09 INFECTIOUS COLITIS: ICD-10-CM

## 2023-06-20 DIAGNOSIS — K59.03 DRUG-INDUCED CONSTIPATION: ICD-10-CM

## 2023-06-20 DIAGNOSIS — K55.20 AVM (ARTERIOVENOUS MALFORMATION) OF SMALL BOWEL, ACQUIRED: ICD-10-CM

## 2023-06-20 DIAGNOSIS — E66.9 OBESITY (BMI 30-39.9): ICD-10-CM

## 2023-06-20 DIAGNOSIS — Z86.010 PERSONAL HISTORY OF COLONIC POLYPS: ICD-10-CM

## 2023-06-20 DIAGNOSIS — K21.9 GASTROESOPHAGEAL REFLUX DISEASE WITHOUT ESOPHAGITIS: ICD-10-CM

## 2023-06-20 PROBLEM — 266435005: Status: ACTIVE | Noted: 2022-02-03

## 2023-06-20 PROBLEM — 413533008 IRON DEFICIENCY ANEMIA DUE TO CHRONIC BLOOD LOSS: Status: ACTIVE | Noted: 2020-10-28

## 2023-06-20 PROCEDURE — 99214 OFFICE O/P EST MOD 30 MIN: CPT | Performed by: INTERNAL MEDICINE

## 2023-06-20 RX ORDER — ANASTROZOLE 1 MG/1
TAKE 1 TABLET (1 MG) BY ORAL ROUTE ONCE DAILY TABLET ORAL 1
Qty: 0 | Refills: 0 | COMMUNITY
Start: 1900-01-01

## 2023-06-20 RX ORDER — FERROUS GLUCONATE 324 MG
1 TABLET WITH WATER OR JUICE BETWEEN MEALS TABLET ORAL ONCE A DAY
Qty: 90 | Refills: 3 | COMMUNITY
Start: 2020-10-29

## 2023-06-20 RX ORDER — PANTOPRAZOLE SODIUM 40 MG/1
TAKE 1 TABLET TABLET, DELAYED RELEASE ORAL
Qty: 90 | Refills: 3 | COMMUNITY

## 2023-06-20 RX ORDER — FLUOXETINE 20 MG/1
TAKE 1 CAPSULE (20 MG) BY ORAL ROUTE ONCE DAILY CAPSULE ORAL 1
Qty: 0 | Refills: 0 | COMMUNITY
Start: 1900-01-01

## 2023-06-20 RX ORDER — QUETIAPINE FUMARATE 25 MG/1
AS NEEDED TABLET, FILM COATED ORAL
Qty: 0 | Refills: 0 | COMMUNITY
Start: 1900-01-01

## 2023-06-20 RX ORDER — ACETAMINOPHEN 325 MG/1
TABLET, FILM COATED ORAL
Qty: 0 | Refills: 0 | COMMUNITY
Start: 1900-01-01

## 2023-06-20 RX ORDER — AMLODIPINE BESYLATE 10 MG/1
1 TABLET TABLET ORAL ONCE A DAY
COMMUNITY

## 2023-06-20 RX ORDER — PANTOPRAZOLE SODIUM 20 MG/1
1 TABLET TABLET, DELAYED RELEASE ORAL
Qty: 90 | Refills: 3 | OUTPATIENT

## 2023-06-20 RX ORDER — EZETIMIBE 10 MG/1
TAKE 1 TABLET (10 MG) BY ORAL ROUTE ONCE DAILY TABLET ORAL 1
Qty: 0 | Refills: 0 | COMMUNITY
Start: 1900-01-01

## 2023-06-20 RX ORDER — MELOXICAM 15 MG/1
TAKE 1 TABLET (15 MG) BY ORAL ROUTE ONCE DAILY TABLET ORAL 1
Qty: 0 | Refills: 0 | COMMUNITY
Start: 1900-01-01

## 2023-06-20 RX ORDER — ONDANSETRON 4 MG/1
1 TABLET ON THE TONGUE AND ALLOW TO DISSOLVE TABLET, ORALLY DISINTEGRATING ORAL
Qty: 30 | Refills: 11 | COMMUNITY

## 2023-06-20 RX ORDER — METFORMIN HYDROCHLORIDE 500 MG/1
TAKE 1 TABLET (500 MG) BY ORAL ROUTE 1 TIME PER DAY WITH MORNING AND EVENING MEALS TABLET, COATED ORAL 2
Qty: 0 | Refills: 0 | COMMUNITY
Start: 1900-01-01

## 2023-06-20 NOTE — HPI-TODAY'S VISIT:
The patient is an /White female who presents on follow-up for iron deficiency anemia.  PAST MEDICAL HISTORY: On 8/14/19, the patient stated that while she was having chemo for breast cancer, her hemoglobin dropped and she had two iron infusions. Her last infusion was in February. She finished chemo in March and her oncologist Dr. Rodriguez had referred her because of her iron deficiency anemia/drop in H/H. She denied seeing blood in her BMs. She denied NSAID uses and she was not currently taking oral iron.   She had previously been followed by Dr. Guzman and by Dr. Castro. She underwent an EGD/small bowel capsule study in 2015 and was noted to have small bowel AVMs.  She had had previous colonoscopies in 2013 and 2010. Iron supplementation was recommended at that time.  I did not have any records from Dr. Rodriguez to review today.  On 10/28/20, she said she had an episode of diarrhea after eating a blueberry salad with walnuts and parmesan. She thought she saw blood in her stool, but it was actually a piece of blueberry. She was off iron. She was on Meloxicam QOD. She had recent labwork with her PCP.  On 2/3/21, she said she started on iron 1 pill QD. She denied bleeding. No complaints.  On 8/3/21, she said she continued on iron 1 pill QD with associated constipation occasionally ("pebble" stools). She took pantoprazole/3-4 days. She took Meloxicam 1-2x/week.  On 2/3/22, she said she continued on iron 1 pill QD. She was off pantoprazole and noted heartburn. She took Miralax 1 cap QD - she had 1 BM QD that could be soft or hard. She took Meloxicam QOD when needed.  On 3/3/22, patient seen in hospital follow-up after an inpatient stay at [facility] from 2/22-2/24/22 for infectious colitis.  Tx'ed with Cipro/Flagyl.  Today, she said she finished abx treatment for infectious colitis. She currently had a little bit of nausea without emesis. She was eating small meals. She had 2-3 soft/watery BMs QD without blood.  On 4/12/22, she said her last BM was Yaakov - stool was not hard and did not strain. She was not using Miralax currently. Nausea was occasional with a meal. Zofran quickly relieved her nausea she stated - took it rarely. She was off iron. [facility]  Dr. Ho Garcia at [place]. On 7/12/22, she said she had resumed Miralax, taking 1 packet QD. She had a daily soft BM. She continued on pantoprazole 40 mg QD - no heartburn. Nausea was only w/ certain foods she stated. She was off iron.   Her and her  had COVID last month - only symptoms were an intermittent headache and sweats. She took an antiviral medication for 5 days.  On 1/4/23, she said she only had nausea with certain meats like pork and fried chicken, so she had made diet changes. Ondansetron worked. She was not using promethazine. She could have pebbly BMs. She was using Miralax 1 cap every Monday, Wednesday, and Friday. She continued on pantoprazole 40 mg QD.  HPI: Today, she says she uses Miralax 1 packet almost daily, missing 2 doses/week. She has a daily BM. She sees some blood on the tissue. She is much happier with her bowel habit. She is off iron supplementation. No heartburn on pantoprazole 40 mg QD.  Labs 1/4/23 - CBC normal except hgb 11.3. Ferritin 308, TIBC 281, iron 54, iron sat 19%. 7/12/22 - TIBC 263, UIBC 219, iron 44, iron sat 17%, ferritin 460. CBC normal (hgb 11.1). 2/24/22 - CBC, CMP all normal. 2/3/22 - Ferritin 694, TIBC 265, UIBC 218, iron 47, iron sat 18%. CBC normal (hgb 11.9). 8/5/21 - Iron sat 14%, TIBC 258, UIBC 223, iron 35, ferritin 492. CBC normal (hgb 11.5). 2/3/21 - Ferritin 547. CBC, iron/TIBC all normal. 10/28/20 - CBC normal except hgb 11. Iron sat 12%, ferritin 537. 8/14/19 - Iron sat 13%, ferritin 693. CBC normal. 3/5/18 - CBC normal, Iron sat 10%, Ferritin 308.

## 2023-06-21 LAB
ABSOLUTE BASOPHILS: 32
ABSOLUTE EOSINOPHILS: 282
ABSOLUTE LYMPHOCYTES: 2554
ABSOLUTE MONOCYTES: 480
ABSOLUTE NEUTROPHILS: 3053
BASOPHILS: 0.5
EOSINOPHILS: 4.4
FERRITIN, SERUM: 321
HEMATOCRIT: 32
HEMOGLOBIN: 10.6
IRON BIND.CAP.(TIBC): 288
IRON SATURATION: 17
IRON: 48
LYMPHOCYTES: 39.9
MCH: 29
MCHC: 33.1
MCV: 87.4
MONOCYTES: 7.5
MPV: 10.4
NEUTROPHILS: 47.7
PLATELET COUNT: 257
RDW: 13.2
RED BLOOD CELL COUNT: 3.66
WHITE BLOOD CELL COUNT: 6.4

## 2023-10-01 PROBLEM — I63.81 LACUNAR INFARCTION: Status: ACTIVE | Noted: 2018-12-19

## 2023-11-27 ENCOUNTER — APPOINTMENT (OUTPATIENT)
Dept: DERMATOLOGY | Facility: CLINIC | Age: 63
End: 2023-11-27
Payer: COMMERCIAL

## 2023-11-27 DIAGNOSIS — L57.0 ACTINIC KERATOSIS: ICD-10-CM

## 2023-11-27 DIAGNOSIS — L81.4 OTHER MELANIN HYPERPIGMENTATION: ICD-10-CM

## 2023-11-27 DIAGNOSIS — L72.0 EPIDERMAL CYST: ICD-10-CM

## 2023-11-27 PROCEDURE — 99213 OFFICE O/P EST LOW 20 MIN: CPT

## 2023-11-27 RX ORDER — TRIAMCINOLONE ACETONIDE 1 MG/G
0.1 OINTMENT TOPICAL
Qty: 30 | Refills: 1 | Status: ACTIVE | COMMUNITY
Start: 2023-11-27 | End: 1900-01-01

## 2023-12-20 ENCOUNTER — LAB OUTSIDE AN ENCOUNTER (OUTPATIENT)
Dept: URBAN - METROPOLITAN AREA CLINIC 105 | Facility: CLINIC | Age: 63
End: 2023-12-20

## 2023-12-20 ENCOUNTER — CLAIMS CREATED FROM THE CLAIM WINDOW (OUTPATIENT)
Dept: URBAN - METROPOLITAN AREA CLINIC 105 | Facility: CLINIC | Age: 63
End: 2023-12-20
Payer: MEDICARE

## 2023-12-20 ENCOUNTER — DASHBOARD ENCOUNTERS (OUTPATIENT)
Age: 63
End: 2023-12-20

## 2023-12-20 VITALS
TEMPERATURE: 97.2 F | SYSTOLIC BLOOD PRESSURE: 132 MMHG | BODY MASS INDEX: 39.99 KG/M2 | HEIGHT: 69 IN | HEART RATE: 100 BPM | WEIGHT: 270 LBS | DIASTOLIC BLOOD PRESSURE: 82 MMHG

## 2023-12-20 DIAGNOSIS — R11.0 NAUSEA: ICD-10-CM

## 2023-12-20 DIAGNOSIS — K59.03 DRUG-INDUCED CONSTIPATION: ICD-10-CM

## 2023-12-20 DIAGNOSIS — K55.20 AVM (ARTERIOVENOUS MALFORMATION) OF SMALL BOWEL, ACQUIRED: ICD-10-CM

## 2023-12-20 DIAGNOSIS — Z86.010 PERSONAL HISTORY OF COLONIC POLYPS: ICD-10-CM

## 2023-12-20 DIAGNOSIS — K21.9 GASTROESOPHAGEAL REFLUX DISEASE WITHOUT ESOPHAGITIS: ICD-10-CM

## 2023-12-20 DIAGNOSIS — E66.9 OBESITY (BMI 30-39.9): ICD-10-CM

## 2023-12-20 DIAGNOSIS — D64.9 ANEMIA, UNSPECIFIED TYPE: ICD-10-CM

## 2023-12-20 DIAGNOSIS — A09 INFECTIOUS COLITIS: ICD-10-CM

## 2023-12-20 DIAGNOSIS — D50.0 IRON DEFICIENCY ANEMIA DUE TO CHRONIC BLOOD LOSS: ICD-10-CM

## 2023-12-20 PROCEDURE — 99214 OFFICE O/P EST MOD 30 MIN: CPT | Performed by: INTERNAL MEDICINE

## 2023-12-20 RX ORDER — ANASTROZOLE 1 MG/1
TAKE 1 TABLET (1 MG) BY ORAL ROUTE ONCE DAILY TABLET ORAL 1
Qty: 0 | Refills: 0 | Status: ACTIVE | COMMUNITY
Start: 1900-01-01

## 2023-12-20 RX ORDER — MELOXICAM 15 MG/1
TAKE 1 TABLET (15 MG) BY ORAL ROUTE ONCE DAILY TABLET ORAL 1
Qty: 0 | Refills: 0 | Status: ACTIVE | COMMUNITY
Start: 1900-01-01

## 2023-12-20 RX ORDER — FLUOXETINE 20 MG/1
TAKE 1 CAPSULE (20 MG) BY ORAL ROUTE ONCE DAILY CAPSULE ORAL 1
Qty: 0 | Refills: 0 | Status: ACTIVE | COMMUNITY
Start: 1900-01-01

## 2023-12-20 RX ORDER — AMLODIPINE BESYLATE 10 MG/1
1 TABLET TABLET ORAL ONCE A DAY
Status: ACTIVE | COMMUNITY

## 2023-12-20 RX ORDER — ONDANSETRON 4 MG/1
1 TABLET ON THE TONGUE AND ALLOW TO DISSOLVE TABLET, ORALLY DISINTEGRATING ORAL
Qty: 30 | Refills: 11 | Status: ACTIVE | COMMUNITY

## 2023-12-20 RX ORDER — PANTOPRAZOLE SODIUM 20 MG/1
1 TABLET TABLET, DELAYED RELEASE ORAL
Qty: 90 | Refills: 3 | Status: ACTIVE | COMMUNITY

## 2023-12-20 RX ORDER — FERROUS GLUCONATE 324 MG
1 TABLET WITH WATER OR JUICE BETWEEN MEALS TABLET ORAL ONCE A DAY
Qty: 90 | Refills: 3 | Status: ACTIVE | COMMUNITY
Start: 2020-10-29

## 2023-12-20 RX ORDER — ACETAMINOPHEN 325 MG/1
TABLET, FILM COATED ORAL
Qty: 0 | Refills: 0 | Status: ACTIVE | COMMUNITY
Start: 1900-01-01

## 2023-12-20 RX ORDER — METFORMIN HYDROCHLORIDE 500 MG/1
TAKE 1 TABLET (500 MG) BY ORAL ROUTE 1 TIME PER DAY WITH MORNING AND EVENING MEALS TABLET, COATED ORAL 2
Qty: 0 | Refills: 0 | Status: ACTIVE | COMMUNITY
Start: 1900-01-01

## 2023-12-20 RX ORDER — EZETIMIBE 10 MG/1
TAKE 1 TABLET (10 MG) BY ORAL ROUTE ONCE DAILY TABLET ORAL 1
Qty: 0 | Refills: 0 | Status: ACTIVE | COMMUNITY
Start: 1900-01-01

## 2023-12-20 RX ORDER — QUETIAPINE FUMARATE 25 MG/1
AS NEEDED TABLET, FILM COATED ORAL
Qty: 0 | Refills: 0 | Status: ACTIVE | COMMUNITY
Start: 1900-01-01

## 2023-12-20 NOTE — HPI-TODAY'S VISIT:
The patient is an /White female who presents on follow-up for iron deficiency anemia.  PAST MEDICAL HISTORY: On 8/14/19, the patient stated that while she was having chemo for breast cancer, her hemoglobin dropped and she had two iron infusions. Her last infusion was in February. She finished chemo in March and her oncologist Dr. Rodriguez had referred her because of her iron deficiency anemia/drop in H/H. She denied seeing blood in her BMs. She denied NSAID uses and she was not currently taking oral iron.   She had previously been followed by Dr. Guzman and by Dr. Castro. She underwent an EGD/small bowel capsule study in 2015 and was noted to have small bowel AVMs.  She had had previous colonoscopies in 2013 and 2010. Iron supplementation was recommended at that time.  I did not have any records from Dr. Rodriguez to review today.  On 10/28/20, she said she had an episode of diarrhea after eating a blueberry salad with walnuts and parmesan. She thought she saw blood in her stool, but it was actually a piece of blueberry. She was off iron. She was on Meloxicam QOD. She had recent labwork with her PCP.  On 2/3/21, she said she started on iron 1 pill QD. She denied bleeding. No complaints.  On 8/3/21, she said she continued on iron 1 pill QD with associated constipation occasionally ("pebble" stools). She took pantoprazole/3-4 days. She took Meloxicam 1-2x/week.  On 2/3/22, she said she continued on iron 1 pill QD. She was off pantoprazole and noted heartburn. She took Miralax 1 cap QD - she had 1 BM QD that could be soft or hard. She took Meloxicam QOD when needed.  On 3/3/22, patient seen in hospital follow-up after an inpatient stay at [facility] from 2/22-2/24/22 for infectious colitis.  Tx'ed with Cipro/Flagyl.  Today, she said she finished abx treatment for infectious colitis. She currently had a little bit of nausea without emesis. She was eating small meals. She had 2-3 soft/watery BMs QD without blood.  On 4/12/22, she said her last BM was Yaakov - stool was not hard and did not strain. She was not using Miralax currently. Nausea was occasional with a meal. Zofran quickly relieved her nausea she stated - took it rarely. She was off iron. [facility]  Dr. Ho Garcia at [place]. On 7/12/22, she said she had resumed Miralax, taking 1 packet QD. She had a daily soft BM. She continued on pantoprazole 40 mg QD - no heartburn. Nausea was only w/ certain foods she stated. She was off iron.   Her and her  had COVID last month - only symptoms were an intermittent headache and sweats. She took an antiviral medication for 5 days.  On 1/4/23, she said she only had nausea with certain meats like pork and fried chicken, so she had made diet changes. Ondansetron worked. She was not using promethazine. She could have pebbly BMs. She was using Miralax 1 cap every Monday, Wednesday, and Friday. She continued on pantoprazole 40 mg QD.  On 6/20/23, she said she used Miralax 1 packet almost daily, missing 2 doses/week. She had a daily BM. She saw some blood on the tissue. She was much happier with her bowel habit. She was off iron supplementation. No heartburn on pantoprazole 40 mg QD.  HPI: Today, she says she decreased from pantoprazole 40 mg to 20 mg and is currently doing well on the 20 mg dosage. She has nausea with certain types of meats (pork & red meats).  Labs 6/20/23 - CBC normal except hgb 10.6. Ferritin 321. Iron/TIBC normal (iron 48, TIBC 288, iron sat 17%). 1/4/23 - CBC normal except hgb 11.3. Ferritin 308, TIBC 281, iron 54, iron sat 19%. 7/12/22 - TIBC 263, UIBC 219, iron 44, iron sat 17%, ferritin 460. CBC normal (hgb 11.1). 2/24/22 - CBC, CMP all normal. 2/3/22 - Ferritin 694, TIBC 265, UIBC 218, iron 47, iron sat 18%. CBC normal (hgb 11.9). 8/5/21 - Iron sat 14%, TIBC 258, UIBC 223, iron 35, ferritin 492. CBC normal (hgb 11.5). 2/3/21 - Ferritin 547. CBC, iron/TIBC all normal. 10/28/20 - CBC normal except hgb 11. Iron sat 12%, ferritin 537. 8/14/19 - Iron sat 13%, ferritin 693. CBC normal. 3/5/18 - CBC normal, Iron sat 10%, Ferritin 308.

## 2023-12-21 LAB
ABSOLUTE BASOPHILS: 43
ABSOLUTE EOSINOPHILS: 259
ABSOLUTE LYMPHOCYTES: 2174
ABSOLUTE MONOCYTES: 439
ABSOLUTE NEUTROPHILS: 4284
BASOPHILS: 0.6
EOSINOPHILS: 3.6
FERRITIN, SERUM: 250
FOLATE (FOLIC ACID), SERUM: 16.7
HEMATOCRIT: 35.2
HEMOGLOBIN: 11.8
IRON BIND.CAP.(TIBC): 293
IRON SATURATION: 15
IRON: 45
LYMPHOCYTES: 30.2
MCH: 29.1
MCHC: 33.5
MCV: 86.9
MONOCYTES: 6.1
MPV: 11
NEUTROPHILS: 59.5
PLATELET COUNT: 305
RDW: 13.2
RED BLOOD CELL COUNT: 4.05
VITAMIN B12: 1101
WHITE BLOOD CELL COUNT: 7.2

## 2024-04-15 ENCOUNTER — COL/EGD (OUTPATIENT)
Dept: URBAN - METROPOLITAN AREA SURGERY CENTER 16 | Facility: SURGERY CENTER | Age: 64
End: 2024-04-15

## 2024-05-16 ENCOUNTER — OUT OF OFFICE VISIT (OUTPATIENT)
Dept: URBAN - METROPOLITAN AREA SURGERY CENTER 16 | Facility: SURGERY CENTER | Age: 64
End: 2024-05-16
Payer: MEDICARE

## 2024-05-16 ENCOUNTER — CLAIMS CREATED FROM THE CLAIM WINDOW (OUTPATIENT)
Dept: URBAN - METROPOLITAN AREA CLINIC 4 | Facility: CLINIC | Age: 64
End: 2024-05-16
Payer: MEDICARE

## 2024-05-16 DIAGNOSIS — K21.9 ACID REFLUX: ICD-10-CM

## 2024-05-16 DIAGNOSIS — Z86.010 ADENOMAS PERSONAL HISTORY OF COLONIC POLYPS: ICD-10-CM

## 2024-05-16 DIAGNOSIS — D12.2 ADENOMA OF ASCENDING COLON: ICD-10-CM

## 2024-05-16 DIAGNOSIS — K31.89 OTHER DISEASES OF STOMACH AND DUODENUM: ICD-10-CM

## 2024-05-16 DIAGNOSIS — R11.2 ACUTE NAUSEA WITH NONBILIOUS VOMITING: ICD-10-CM

## 2024-05-16 DIAGNOSIS — Z12.11 COLON CANCER SCREENING: ICD-10-CM

## 2024-05-16 DIAGNOSIS — D12.2 ADENOMATOUS POLYP OF ASCENDING COLON: ICD-10-CM

## 2024-05-16 DIAGNOSIS — D12.2 BENIGN NEOPLASM OF ASCENDING COLON: ICD-10-CM

## 2024-05-16 DIAGNOSIS — K44.9 HIATAL HERNIA: ICD-10-CM

## 2024-05-16 PROCEDURE — 00813 ANES UPR LWR GI NDSC PX: CPT | Performed by: NURSE ANESTHETIST, CERTIFIED REGISTERED

## 2024-05-16 PROCEDURE — 45380 COLONOSCOPY AND BIOPSY: CPT | Performed by: CLINIC/CENTER

## 2024-05-16 PROCEDURE — G8907 PT DOC NO EVENTS ON DISCHARG: HCPCS | Performed by: CLINIC/CENTER

## 2024-05-16 PROCEDURE — 45380 COLONOSCOPY AND BIOPSY: CPT | Performed by: INTERNAL MEDICINE

## 2024-05-16 PROCEDURE — 43239 EGD BIOPSY SINGLE/MULTIPLE: CPT | Performed by: INTERNAL MEDICINE

## 2024-05-16 PROCEDURE — 88305 TISSUE EXAM BY PATHOLOGIST: CPT | Performed by: STUDENT IN AN ORGANIZED HEALTH CARE EDUCATION/TRAINING PROGRAM

## 2024-05-16 PROCEDURE — 43239 EGD BIOPSY SINGLE/MULTIPLE: CPT | Performed by: CLINIC/CENTER

## 2024-05-16 RX ORDER — FLUOXETINE 20 MG/1
TAKE 1 CAPSULE (20 MG) BY ORAL ROUTE ONCE DAILY CAPSULE ORAL 1
Qty: 0 | Refills: 0 | Status: ACTIVE | COMMUNITY
Start: 1900-01-01

## 2024-05-16 RX ORDER — MELOXICAM 15 MG/1
TAKE 1 TABLET (15 MG) BY ORAL ROUTE ONCE DAILY TABLET ORAL 1
Qty: 0 | Refills: 0 | Status: ACTIVE | COMMUNITY
Start: 1900-01-01

## 2024-05-16 RX ORDER — METFORMIN HYDROCHLORIDE 500 MG/1
TAKE 1 TABLET (500 MG) BY ORAL ROUTE 1 TIME PER DAY WITH MORNING AND EVENING MEALS TABLET, COATED ORAL 2
Qty: 0 | Refills: 0 | Status: ACTIVE | COMMUNITY
Start: 1900-01-01

## 2024-05-16 RX ORDER — QUETIAPINE FUMARATE 25 MG/1
AS NEEDED TABLET, FILM COATED ORAL
Qty: 0 | Refills: 0 | Status: ACTIVE | COMMUNITY
Start: 1900-01-01

## 2024-05-16 RX ORDER — AMLODIPINE BESYLATE 10 MG/1
1 TABLET TABLET ORAL ONCE A DAY
Status: ACTIVE | COMMUNITY

## 2024-05-16 RX ORDER — EZETIMIBE 10 MG/1
TAKE 1 TABLET (10 MG) BY ORAL ROUTE ONCE DAILY TABLET ORAL 1
Qty: 0 | Refills: 0 | Status: ACTIVE | COMMUNITY
Start: 1900-01-01

## 2024-05-16 RX ORDER — ANASTROZOLE 1 MG/1
TAKE 1 TABLET (1 MG) BY ORAL ROUTE ONCE DAILY TABLET ORAL 1
Qty: 0 | Refills: 0 | Status: ACTIVE | COMMUNITY
Start: 1900-01-01

## 2024-05-16 RX ORDER — FERROUS GLUCONATE 324 MG
1 TABLET WITH WATER OR JUICE BETWEEN MEALS TABLET ORAL ONCE A DAY
Qty: 90 | Refills: 3 | Status: ACTIVE | COMMUNITY
Start: 2020-10-29

## 2024-05-16 RX ORDER — PANTOPRAZOLE SODIUM 20 MG/1
1 TABLET TABLET, DELAYED RELEASE ORAL
Qty: 90 | Refills: 3 | Status: ACTIVE | COMMUNITY

## 2024-05-16 RX ORDER — ONDANSETRON 4 MG/1
1 TABLET ON THE TONGUE AND ALLOW TO DISSOLVE TABLET, ORALLY DISINTEGRATING ORAL
Qty: 30 | Refills: 11 | Status: ACTIVE | COMMUNITY

## 2024-05-16 RX ORDER — ACETAMINOPHEN 325 MG/1
TABLET, FILM COATED ORAL
Qty: 0 | Refills: 0 | Status: ACTIVE | COMMUNITY
Start: 1900-01-01

## 2024-05-22 ENCOUNTER — ERX REFILL RESPONSE (OUTPATIENT)
Dept: URBAN - METROPOLITAN AREA CLINIC 105 | Facility: CLINIC | Age: 64
End: 2024-05-22

## 2024-05-22 ENCOUNTER — TELEPHONE ENCOUNTER (OUTPATIENT)
Dept: URBAN - METROPOLITAN AREA CLINIC 105 | Facility: CLINIC | Age: 64
End: 2024-05-22

## 2024-05-22 RX ORDER — ONDANSETRON 4 MG/1
1 TABLET ON THE TONGUE AND ALLOW TO DISSOLVE TABLET, ORALLY DISINTEGRATING ORAL
Qty: 30 | Refills: 11 | OUTPATIENT

## 2024-05-22 RX ORDER — ONDANSETRON 4 MG/1
1 TABLET ON THE TONGUE AND ALLOW TO DISSOLVE TABLET, ORALLY DISINTEGRATING ORAL
Qty: 30 | Refills: 11

## 2024-07-16 ENCOUNTER — OFFICE VISIT (OUTPATIENT)
Dept: URBAN - METROPOLITAN AREA CLINIC 105 | Facility: CLINIC | Age: 64
End: 2024-07-16
Payer: MEDICARE

## 2024-07-16 VITALS
HEART RATE: 98 BPM | HEIGHT: 69 IN | BODY MASS INDEX: 38.66 KG/M2 | DIASTOLIC BLOOD PRESSURE: 78 MMHG | WEIGHT: 261 LBS | TEMPERATURE: 97.2 F | SYSTOLIC BLOOD PRESSURE: 116 MMHG

## 2024-07-16 DIAGNOSIS — K21.9 GASTROESOPHAGEAL REFLUX DISEASE WITHOUT ESOPHAGITIS: ICD-10-CM

## 2024-07-16 DIAGNOSIS — D50.0 IRON DEFICIENCY ANEMIA DUE TO CHRONIC BLOOD LOSS: ICD-10-CM

## 2024-07-16 PROCEDURE — 99214 OFFICE O/P EST MOD 30 MIN: CPT | Performed by: INTERNAL MEDICINE

## 2024-07-16 RX ORDER — ONDANSETRON 4 MG/1
1 TABLET ON THE TONGUE AND ALLOW TO DISSOLVE TABLET, ORALLY DISINTEGRATING ORAL
Qty: 30 | Refills: 11 | Status: ACTIVE | COMMUNITY

## 2024-07-16 RX ORDER — EZETIMIBE 10 MG/1
TAKE 1 TABLET (10 MG) BY ORAL ROUTE ONCE DAILY TABLET ORAL 1
Qty: 0 | Refills: 0 | Status: ACTIVE | COMMUNITY
Start: 1900-01-01

## 2024-07-16 RX ORDER — QUETIAPINE FUMARATE 25 MG/1
AS NEEDED TABLET, FILM COATED ORAL
Qty: 0 | Refills: 0 | Status: ACTIVE | COMMUNITY
Start: 1900-01-01

## 2024-07-16 RX ORDER — FERROUS GLUCONATE 324 MG
1 TABLET WITH WATER OR JUICE BETWEEN MEALS TABLET ORAL ONCE A DAY
Qty: 90 | Refills: 3 | Status: ACTIVE | COMMUNITY
Start: 2020-10-29

## 2024-07-16 RX ORDER — AMLODIPINE BESYLATE 10 MG/1
1 TABLET TABLET ORAL ONCE A DAY
Status: ACTIVE | COMMUNITY

## 2024-07-16 RX ORDER — PANTOPRAZOLE SODIUM 20 MG/1
1 TABLET TABLET, DELAYED RELEASE ORAL
Qty: 90 | Refills: 3 | Status: ACTIVE | COMMUNITY

## 2024-07-16 RX ORDER — ACETAMINOPHEN 325 MG/1
TABLET, FILM COATED ORAL
Qty: 0 | Refills: 0 | Status: ACTIVE | COMMUNITY
Start: 1900-01-01

## 2024-07-16 RX ORDER — MELOXICAM 15 MG/1
TAKE 1 TABLET (15 MG) BY ORAL ROUTE ONCE DAILY TABLET ORAL 1
Qty: 0 | Refills: 0 | Status: ACTIVE | COMMUNITY
Start: 1900-01-01

## 2024-07-16 RX ORDER — METFORMIN HYDROCHLORIDE 500 MG/1
TAKE 1 TABLET (500 MG) BY ORAL ROUTE 1 TIME PER DAY WITH MORNING AND EVENING MEALS TABLET, COATED ORAL 2
Qty: 0 | Refills: 0 | Status: ACTIVE | COMMUNITY
Start: 1900-01-01

## 2024-07-16 RX ORDER — FLUOXETINE 20 MG/1
TAKE 1 CAPSULE (20 MG) BY ORAL ROUTE ONCE DAILY CAPSULE ORAL 1
Qty: 0 | Refills: 0 | Status: ACTIVE | COMMUNITY
Start: 1900-01-01

## 2024-07-16 RX ORDER — PANTOPRAZOLE SODIUM 20 MG/1
1 TABLET TABLET, DELAYED RELEASE ORAL
Qty: 90 TABLET | Refills: 3 | OUTPATIENT

## 2024-07-16 RX ORDER — ANASTROZOLE 1 MG/1
TAKE 1 TABLET (1 MG) BY ORAL ROUTE ONCE DAILY TABLET ORAL 1
Qty: 0 | Refills: 0 | Status: ACTIVE | COMMUNITY
Start: 1900-01-01

## 2024-07-16 NOTE — HPI-TODAY'S VISIT:
The patient is an /White female who presents on follow-up for iron deficiency anemia.  PAST MEDICAL HISTORY: On 8/14/19, the patient stated that while she was having chemo for breast cancer, her hemoglobin dropped and she had two iron infusions. Her last infusion was in February. She finished chemo in March and her oncologist Dr. Rodriguez had referred her because of her iron deficiency anemia/drop in H/H. She denied seeing blood in her BMs. She denied NSAID uses and she was not currently taking oral iron.   She had previously been followed by Dr. Guzman and by Dr. Castro. She underwent an EGD/small bowel capsule study in 2015 and was noted to have small bowel AVMs.  She had had previous colonoscopies in 2013 and 2010. Iron supplementation was recommended at that time.  I did not have any records from Dr. Rodriguez to review today.  On 10/28/20, she said she had an episode of diarrhea after eating a blueberry salad with walnuts and parmesan. She thought she saw blood in her stool, but it was actually a piece of blueberry. She was off iron. She was on Meloxicam QOD. She had recent labwork with her PCP.  On 2/3/21, she said she started on iron 1 pill QD. She denied bleeding. No complaints.  On 8/3/21, she said she continued on iron 1 pill QD with associated constipation occasionally ("pebble" stools). She took pantoprazole/3-4 days. She took Meloxicam 1-2x/week.  On 2/3/22, she said she continued on iron 1 pill QD. She was off pantoprazole and noted heartburn. She took Miralax 1 cap QD - she had 1 BM QD that could be soft or hard. She took Meloxicam QOD when needed.  On 3/3/22, patient seen in hospital follow-up after an inpatient stay at [facility] from 2/22-2/24/22 for infectious colitis.  Tx'ed with Cipro/Flagyl.  Today, she said she finished abx treatment for infectious colitis. She currently had a little bit of nausea without emesis. She was eating small meals. She had 2-3 soft/watery BMs QD without blood.  On 4/12/22, she said her last BM was Yaakov - stool was not hard and did not strain. She was not using Miralax currently. Nausea was occasional with a meal. Zofran quickly relieved her nausea she stated - took it rarely. She was off iron. [facility]  Dr. Ho Garcia at [place]. On 7/12/22, she said she had resumed Miralax, taking 1 packet QD. She had a daily soft BM. She continued on pantoprazole 40 mg QD - no heartburn. Nausea was only w/ certain foods she stated. She was off iron.   Her and her  had COVID last month - only symptoms were an intermittent headache and sweats. She took an antiviral medication for 5 days.  On 1/4/23, she said she only had nausea with certain meats like pork and fried chicken, so she had made diet changes. Ondansetron worked. She was not using promethazine. She could have pebbly BMs. She was using Miralax 1 cap every Monday, Wednesday, and Friday. She continued on pantoprazole 40 mg QD.  On 6/20/23, she said she used Miralax 1 packet almost daily, missing 2 doses/week. She had a daily BM. She saw some blood on the tissue. She was much happier with her bowel habit. She was off iron supplementation. No heartburn on pantoprazole 40 mg QD.  On 12/20/23, she said she decreased from pantoprazole 40 mg to 20 mg and was currently doing well on the 20 mg dosage. She had nausea with certain types of meats (pork & red meats).  HPI: Today, she says she continues on pantoprazole 40 mg QD. She has nausea w/ certain meats. Ondansetron helps. Not taking iron. No constipation.  Labs 12/20/23 - Vit B12 1101, iron sat 15%, iron 45, TIBC 293, ferritin 250. CBC, folate all normal. 6/20/23 - CBC normal except hgb 10.6. Ferritin 321. Iron/TIBC normal (iron 48, TIBC 288, iron sat 17%). 1/4/23 - CBC normal except hgb 11.3. Ferritin 308, TIBC 281, iron 54, iron sat 19%. 7/12/22 - TIBC 263, UIBC 219, iron 44, iron sat 17%, ferritin 460. CBC normal (hgb 11.1). 2/24/22 - CBC, CMP all normal. 2/3/22 - Ferritin 694, TIBC 265, UIBC 218, iron 47, iron sat 18%. CBC normal (hgb 11.9). 8/5/21 - Iron sat 14%, TIBC 258, UIBC 223, iron 35, ferritin 492. CBC normal (hgb 11.5). 2/3/21 - Ferritin 547. CBC, iron/TIBC all normal. 10/28/20 - CBC normal except hgb 11. Iron sat 12%, ferritin 537. 8/14/19 - Iron sat 13%, ferritin 693. CBC normal. 3/5/18 - CBC normal, Iron sat 10%, Ferritin 308.

## 2024-10-25 ENCOUNTER — TELEPHONE ENCOUNTER (OUTPATIENT)
Dept: URBAN - METROPOLITAN AREA CLINIC 105 | Facility: CLINIC | Age: 64
End: 2024-10-25

## 2024-10-25 RX ORDER — PANTOPRAZOLE SODIUM 20 MG/1
1 TABLET TABLET, DELAYED RELEASE ORAL
Qty: 90 TABLET | Refills: 3 | OUTPATIENT

## 2024-10-30 ENCOUNTER — TELEPHONE ENCOUNTER (OUTPATIENT)
Dept: URBAN - METROPOLITAN AREA CLINIC 105 | Facility: CLINIC | Age: 64
End: 2024-10-30

## 2024-10-30 RX ORDER — PANTOPRAZOLE SODIUM 20 MG/1
1 TABLET TABLET, DELAYED RELEASE ORAL
Qty: 90 TABLET | Refills: 3 | OUTPATIENT

## 2025-01-15 ENCOUNTER — OFFICE VISIT (OUTPATIENT)
Dept: URBAN - METROPOLITAN AREA CLINIC 105 | Facility: CLINIC | Age: 65
End: 2025-01-15
Payer: MEDICARE

## 2025-01-15 VITALS
HEIGHT: 69 IN | WEIGHT: 259.8 LBS | HEART RATE: 110 BPM | SYSTOLIC BLOOD PRESSURE: 139 MMHG | BODY MASS INDEX: 38.48 KG/M2 | DIASTOLIC BLOOD PRESSURE: 79 MMHG | TEMPERATURE: 97.1 F

## 2025-01-15 DIAGNOSIS — K21.9 GASTROESOPHAGEAL REFLUX DISEASE WITHOUT ESOPHAGITIS: ICD-10-CM

## 2025-01-15 DIAGNOSIS — Z86.0101 PERSONAL HISTORY OF ADENOMATOUS AND SERRATED COLON POLYPS: ICD-10-CM

## 2025-01-15 DIAGNOSIS — R11.0 NAUSEA: ICD-10-CM

## 2025-01-15 DIAGNOSIS — A09 INFECTIOUS COLITIS: ICD-10-CM

## 2025-01-15 DIAGNOSIS — K59.03 DRUG-INDUCED CONSTIPATION: ICD-10-CM

## 2025-01-15 DIAGNOSIS — D50.0 IRON DEFICIENCY ANEMIA DUE TO CHRONIC BLOOD LOSS: ICD-10-CM

## 2025-01-15 DIAGNOSIS — K55.20 AVM (ARTERIOVENOUS MALFORMATION) OF SMALL BOWEL, ACQUIRED: ICD-10-CM

## 2025-01-15 DIAGNOSIS — E66.812 CLASS 2 OBESITY: ICD-10-CM

## 2025-01-15 PROCEDURE — 99214 OFFICE O/P EST MOD 30 MIN: CPT | Performed by: INTERNAL MEDICINE

## 2025-01-15 RX ORDER — ANASTROZOLE 1 MG/1
TAKE 1 TABLET (1 MG) BY ORAL ROUTE ONCE DAILY TABLET ORAL 1
Qty: 0 | Refills: 0 | Status: ACTIVE | COMMUNITY
Start: 1900-01-01

## 2025-01-15 RX ORDER — PANTOPRAZOLE SODIUM 20 MG/1
1 TABLET TABLET, DELAYED RELEASE ORAL
Qty: 90 TABLET | Refills: 3 | Status: ACTIVE | COMMUNITY

## 2025-01-15 RX ORDER — MELOXICAM 15 MG/1
TAKE 1 TABLET (15 MG) BY ORAL ROUTE ONCE DAILY TABLET ORAL 1
Qty: 0 | Refills: 0 | Status: ACTIVE | COMMUNITY
Start: 1900-01-01

## 2025-01-15 RX ORDER — METFORMIN HYDROCHLORIDE 500 MG/1
TAKE 1 TABLET (500 MG) BY ORAL ROUTE 1 TIME PER DAY WITH MORNING AND EVENING MEALS TABLET, COATED ORAL 2
Qty: 0 | Refills: 0 | Status: ACTIVE | COMMUNITY
Start: 1900-01-01

## 2025-01-15 RX ORDER — PANTOPRAZOLE SODIUM 20 MG/1
1 TABLET 1/2 TO 1 HOUR BEFORE MORNING MEAL TABLET, DELAYED RELEASE ORAL ONCE A DAY
Qty: 90 TABLET | Refills: 3 | OUTPATIENT

## 2025-01-15 RX ORDER — ACETAMINOPHEN 325 MG/1
TABLET, FILM COATED ORAL
Qty: 0 | Refills: 0 | Status: ACTIVE | COMMUNITY
Start: 1900-01-01

## 2025-01-15 RX ORDER — QUETIAPINE FUMARATE 25 MG/1
AS NEEDED TABLET, FILM COATED ORAL
Qty: 0 | Refills: 0 | Status: ACTIVE | COMMUNITY
Start: 1900-01-01

## 2025-01-15 RX ORDER — FERROUS GLUCONATE 324 MG
1 TABLET WITH WATER OR JUICE BETWEEN MEALS TABLET ORAL ONCE A DAY
Qty: 90 | Refills: 3 | Status: ON HOLD | COMMUNITY
Start: 2020-10-29

## 2025-01-15 RX ORDER — EZETIMIBE 10 MG/1
TAKE 1 TABLET (10 MG) BY ORAL ROUTE ONCE DAILY TABLET ORAL 1
Qty: 0 | Refills: 0 | Status: ACTIVE | COMMUNITY
Start: 1900-01-01

## 2025-01-15 RX ORDER — AMLODIPINE BESYLATE 10 MG/1
1 TABLET TABLET ORAL ONCE A DAY
Status: ACTIVE | COMMUNITY

## 2025-01-15 RX ORDER — ONDANSETRON 4 MG/1
1 TABLET ON THE TONGUE AND ALLOW TO DISSOLVE TABLET, ORALLY DISINTEGRATING ORAL
Qty: 30 | Refills: 11 | Status: ACTIVE | COMMUNITY

## 2025-01-15 RX ORDER — FLUOXETINE 20 MG/1
TAKE 1 CAPSULE (20 MG) BY ORAL ROUTE ONCE DAILY CAPSULE ORAL 1
Qty: 0 | Refills: 0 | Status: ACTIVE | COMMUNITY
Start: 1900-01-01

## 2025-01-15 NOTE — HPI-TODAY'S VISIT:
The patient is an /White female who presents on follow-up for iron deficiency anemia.  PAST MEDICAL HISTORY: On 8/14/19, the patient stated that while she was having chemo for breast cancer, her hemoglobin dropped and she had two iron infusions. Her last infusion was in February. She finished chemo in March and her oncologist Dr. Rodriguez had referred her because of her iron deficiency anemia/drop in H/H. She denied seeing blood in her BMs. She denied NSAID uses and she was not currently taking oral iron.   She had previously been followed by Dr. Guzman and by Dr. Castro. She underwent an EGD/small bowel capsule study in 2015 and was noted to have small bowel AVMs.  She had had previous colonoscopies in 2013 and 2010. Iron supplementation was recommended at that time.  I did not have any records from Dr. Rodriguez to review today.  On 10/28/20, she said she had an episode of diarrhea after eating a blueberry salad with walnuts and parmesan. She thought she saw blood in her stool, but it was actually a piece of blueberry. She was off iron. She was on Meloxicam QOD. She had recent labwork with her PCP.  On 2/3/21, she said she started on iron 1 pill QD. She denied bleeding. No complaints.  On 8/3/21, she said she continued on iron 1 pill QD with associated constipation occasionally ("pebble" stools). She took pantoprazole/3-4 days. She took Meloxicam 1-2x/week.  On 2/3/22, she said she continued on iron 1 pill QD. She was off pantoprazole and noted heartburn. She took Miralax 1 cap QD - she had 1 BM QD that could be soft or hard. She took Meloxicam QOD when needed.  On 3/3/22, patient seen in hospital follow-up after an inpatient stay at [facility] from 2/22-2/24/22 for infectious colitis.  Tx'ed with Cipro/Flagyl.  Today, she said she finished abx treatment for infectious colitis. She currently had a little bit of nausea without emesis. She was eating small meals. She had 2-3 soft/watery BMs QD without blood.  On 4/12/22, she said her last BM was Yaakvo - stool was not hard and did not strain. She was not using Miralax currently. Nausea was occasional with a meal. Zofran quickly relieved her nausea she stated - took it rarely. She was off iron. [facility]  Dr. Ho Garcia at [place]. On 7/12/22, she said she had resumed Miralax, taking 1 packet QD. She had a daily soft BM. She continued on pantoprazole 40 mg QD - no heartburn. Nausea was only w/ certain foods she stated. She was off iron.   Her and her  had COVID last month - only symptoms were an intermittent headache and sweats. She took an antiviral medication for 5 days.  On 1/4/23, she said she only had nausea with certain meats like pork and fried chicken, so she had made diet changes. Ondansetron worked. She was not using promethazine. She could have pebbly BMs. She was using Miralax 1 cap every Monday, Wednesday, and Friday. She continued on pantoprazole 40 mg QD.  On 6/20/23, she said she used Miralax 1 packet almost daily, missing 2 doses/week. She had a daily BM. She saw some blood on the tissue. She was much happier with her bowel habit. She was off iron supplementation. No heartburn on pantoprazole 40 mg QD.  On 12/20/23, she said she decreased from pantoprazole 40 mg to 20 mg and was currently doing well on the 20 mg dosage. She had nausea with certain types of meats (pork & red meats).  On 7/16/24, she said she continued on pantoprazole 40 mg QD. She had nausea w/ certain meats. Ondansetron helped. Not taking iron. No constipation.  HPI Today, she says she is doing well on pantoprazole 40 mg QD. Ondansetron relieved nausea whenever she got nausea after eating red meat, pork. She now takes ondansetron before eating these meats to avoid nausea. In a month, she will use 30 pills.  Labs 12/20/23 - Vit B12 1101, iron sat 15%, iron 45, TIBC 293, ferritin 250. CBC, folate all normal. 6/20/23 - CBC normal except hgb 10.6. Ferritin 321. Iron/TIBC normal (iron 48, TIBC 288, iron sat 17%). 1/4/23 - CBC normal except hgb 11.3. Ferritin 308, TIBC 281, iron 54, iron sat 19%. 7/12/22 - TIBC 263, UIBC 219, iron 44, iron sat 17%, ferritin 460. CBC normal (hgb 11.1). 2/24/22 - CBC, CMP all normal. 2/3/22 - Ferritin 694, TIBC 265, UIBC 218, iron 47, iron sat 18%. CBC normal (hgb 11.9). 8/5/21 - Iron sat 14%, TIBC 258, UIBC 223, iron 35, ferritin 492. CBC normal (hgb 11.5). 2/3/21 - Ferritin 547. CBC, iron/TIBC all normal. 10/28/20 - CBC normal except hgb 11. Iron sat 12%, ferritin 537. 8/14/19 - Iron sat 13%, ferritin 693. CBC normal. 3/5/18 - CBC normal, Iron sat 10%, Ferritin 308.

## 2025-01-16 ENCOUNTER — TELEPHONE ENCOUNTER (OUTPATIENT)
Dept: URBAN - METROPOLITAN AREA CLINIC 105 | Facility: CLINIC | Age: 65
End: 2025-01-16

## 2025-01-16 PROBLEM — 271737000: Status: ACTIVE | Noted: 2025-01-16

## 2025-01-16 LAB
ABSOLUTE BASOPHILS: 33
ABSOLUTE EOSINOPHILS: 202
ABSOLUTE LYMPHOCYTES: 2282
ABSOLUTE MONOCYTES: 501
ABSOLUTE NEUTROPHILS: 3484
BASOPHILS: 0.5
EOSINOPHILS: 3.1
FERRITIN, SERUM: 177
HEMATOCRIT: 32.3
HEMOGLOBIN: 10.8
IRON BIND.CAP.(TIBC): 282
IRON SATURATION: 13
IRON: 37
LYMPHOCYTES: 35.1
MCH: 29.2
MCHC: 33.4
MCV: 87.3
MONOCYTES: 7.7
MPV: 10.9
NEUTROPHILS: 53.6
PLATELET COUNT: 260
RDW: 13.3
RED BLOOD CELL COUNT: 3.7
WHITE BLOOD CELL COUNT: 6.5

## 2025-05-09 ENCOUNTER — NON-APPOINTMENT (OUTPATIENT)
Age: 65
End: 2025-05-09

## 2025-05-14 ENCOUNTER — TELEPHONE ENCOUNTER (OUTPATIENT)
Dept: URBAN - METROPOLITAN AREA CLINIC 105 | Facility: CLINIC | Age: 65
End: 2025-05-14

## 2025-05-14 RX ORDER — ONDANSETRON 4 MG/1
1 TABLET ON THE TONGUE AND ALLOW TO DISSOLVE TABLET, ORALLY DISINTEGRATING ORAL
Qty: 90 | Refills: 1 | OUTPATIENT

## 2025-07-09 ENCOUNTER — OFFICE VISIT (OUTPATIENT)
Dept: URBAN - METROPOLITAN AREA CLINIC 105 | Facility: CLINIC | Age: 65
End: 2025-07-09
Payer: MEDICARE

## 2025-07-09 DIAGNOSIS — K59.03 DRUG-INDUCED CONSTIPATION: ICD-10-CM

## 2025-07-09 DIAGNOSIS — R11.0 NAUSEA: ICD-10-CM

## 2025-07-09 DIAGNOSIS — E66.9 OBESITY (BMI 30-39.9): ICD-10-CM

## 2025-07-09 DIAGNOSIS — A09 INFECTIOUS COLITIS: ICD-10-CM

## 2025-07-09 DIAGNOSIS — K21.9 GASTROESOPHAGEAL REFLUX DISEASE WITHOUT ESOPHAGITIS: ICD-10-CM

## 2025-07-09 DIAGNOSIS — D64.9 ANEMIA, UNSPECIFIED TYPE: ICD-10-CM

## 2025-07-09 DIAGNOSIS — K55.20 AVM (ARTERIOVENOUS MALFORMATION) OF SMALL BOWEL, ACQUIRED: ICD-10-CM

## 2025-07-09 DIAGNOSIS — D50.0 IRON DEFICIENCY ANEMIA DUE TO CHRONIC BLOOD LOSS: ICD-10-CM

## 2025-07-09 DIAGNOSIS — Z86.0101 PERSONAL HISTORY OF ADENOMATOUS AND SERRATED COLON POLYPS: ICD-10-CM

## 2025-07-09 PROCEDURE — 99214 OFFICE O/P EST MOD 30 MIN: CPT | Performed by: INTERNAL MEDICINE

## 2025-07-09 RX ORDER — SEMAGLUTIDE 0.68 MG/ML
AS DIRECTED INJECTION, SOLUTION SUBCUTANEOUS
Status: ACTIVE | COMMUNITY

## 2025-07-09 RX ORDER — QUETIAPINE FUMARATE 25 MG/1
AS NEEDED TABLET, FILM COATED ORAL
Qty: 0 | Refills: 0 | Status: ACTIVE | COMMUNITY
Start: 1900-01-01

## 2025-07-09 RX ORDER — OMEPRAZOLE 10 MG/1
1 CAPSULE 1/2 TO 1 HOUR BEFORE MORNING MEAL CAPSULE, DELAYED RELEASE ORAL ONCE A DAY
Qty: 90 | Refills: 3 | OUTPATIENT
Start: 2025-07-09

## 2025-07-09 RX ORDER — FLUOXETINE 20 MG/1
TAKE 1 CAPSULE (20 MG) BY ORAL ROUTE ONCE DAILY CAPSULE ORAL 1
Qty: 0 | Refills: 0 | Status: ACTIVE | COMMUNITY
Start: 1900-01-01

## 2025-07-09 RX ORDER — EZETIMIBE 10 MG/1
TAKE 1 TABLET (10 MG) BY ORAL ROUTE ONCE DAILY TABLET ORAL 1
Qty: 0 | Refills: 0 | Status: ACTIVE | COMMUNITY
Start: 1900-01-01

## 2025-07-09 RX ORDER — METFORMIN HYDROCHLORIDE 500 MG/1
TAKE 1 TABLET (500 MG) BY ORAL ROUTE 1 TIME PER DAY WITH MORNING AND EVENING MEALS TABLET, COATED ORAL 2
Qty: 0 | Refills: 0 | Status: ACTIVE | COMMUNITY
Start: 1900-01-01

## 2025-07-09 RX ORDER — FERROUS GLUCONATE 324 MG
1 TABLET WITH WATER OR JUICE BETWEEN MEALS TABLET ORAL ONCE A DAY
Qty: 90 | Refills: 3 | Status: ON HOLD | COMMUNITY
Start: 2020-10-29

## 2025-07-09 RX ORDER — ANASTROZOLE 1 MG/1
TAKE 1 TABLET (1 MG) BY ORAL ROUTE ONCE DAILY TABLET ORAL 1
Qty: 0 | Refills: 0 | Status: ACTIVE | COMMUNITY
Start: 1900-01-01

## 2025-07-09 RX ORDER — ONDANSETRON 4 MG/1
1 TABLET ON THE TONGUE AND ALLOW TO DISSOLVE TABLET, ORALLY DISINTEGRATING ORAL
Qty: 90 | Refills: 1 | Status: ACTIVE | COMMUNITY

## 2025-07-09 RX ORDER — AMLODIPINE BESYLATE 10 MG/1
1 TABLET TABLET ORAL ONCE A DAY
Status: ACTIVE | COMMUNITY

## 2025-07-09 RX ORDER — ACETAMINOPHEN 325 MG/1
TABLET, FILM COATED ORAL
Qty: 0 | Refills: 0 | Status: ACTIVE | COMMUNITY
Start: 1900-01-01

## 2025-07-09 RX ORDER — MELOXICAM 15 MG/1
TAKE 1 TABLET (15 MG) BY ORAL ROUTE ONCE DAILY TABLET ORAL 1
Qty: 0 | Refills: 0 | Status: ACTIVE | COMMUNITY
Start: 1900-01-01

## 2025-07-09 RX ORDER — PANTOPRAZOLE SODIUM 20 MG/1
1 TABLET 1/2 TO 1 HOUR BEFORE MORNING MEAL TABLET, DELAYED RELEASE ORAL ONCE A DAY
Qty: 90 TABLET | Refills: 3 | Status: ACTIVE | COMMUNITY

## 2025-07-09 NOTE — HPI-TODAY'S VISIT:
The patient is an /White female who presents on follow-up for iron deficiency anemia.  PAST MEDICAL HISTORY: On 8/14/19, the patient stated that while she was having chemo for breast cancer, her hemoglobin dropped and she had two iron infusions. Her last infusion was in February. She finished chemo in March and her oncologist Dr. Rodriguez had referred her because of her iron deficiency anemia/drop in H/H. She denied seeing blood in her BMs. She denied NSAID uses and she was not currently taking oral iron.   She had previously been followed by Dr. Guzman and by Dr. Castro. She underwent an EGD/small bowel capsule study in 2015 and was noted to have small bowel AVMs.  She had had previous colonoscopies in 2013 and 2010. Iron supplementation was recommended at that time.  I did not have any records from Dr. Rodriguez to review today.  On 10/28/20, she said she had an episode of diarrhea after eating a blueberry salad with walnuts and parmesan. She thought she saw blood in her stool, but it was actually a piece of blueberry. She was off iron. She was on Meloxicam QOD. She had recent labwork with her PCP.  On 2/3/21, she said she started on iron 1 pill QD. She denied bleeding. No complaints.  On 8/3/21, she said she continued on iron 1 pill QD with associated constipation occasionally ("pebble" stools). She took pantoprazole/3-4 days. She took Meloxicam 1-2x/week.  On 2/3/22, she said she continued on iron 1 pill QD. She was off pantoprazole and noted heartburn. She took Miralax 1 cap QD - she had 1 BM QD that could be soft or hard. She took Meloxicam QOD when needed.  On 3/3/22, patient seen in hospital follow-up after an inpatient stay at [facility] from 2/22-2/24/22 for infectious colitis.  Tx'ed with Cipro/Flagyl.  Today, she said she finished abx treatment for infectious colitis. She currently had a little bit of nausea without emesis. She was eating small meals. She had 2-3 soft/watery BMs QD without blood.  On 4/12/22, she said her last BM was Yaakov - stool was not hard and did not strain. She was not using Miralax currently. Nausea was occasional with a meal. Zofran quickly relieved her nausea she stated - took it rarely. She was off iron. [facility]  Dr. Ho Garcia at [place]. On 7/12/22, she said she had resumed Miralax, taking 1 packet QD. She had a daily soft BM. She continued on pantoprazole 40 mg QD - no heartburn. Nausea was only w/ certain foods she stated. She was off iron.   Her and her  had COVID last month - only symptoms were an intermittent headache and sweats. She took an antiviral medication for 5 days.  On 1/4/23, she said she only had nausea with certain meats like pork and fried chicken, so she had made diet changes. Ondansetron worked. She was not using promethazine. She could have pebbly BMs. She was using Miralax 1 cap every Monday, Wednesday, and Friday. She continued on pantoprazole 40 mg QD.  On 6/20/23, she said she used Miralax 1 packet almost daily, missing 2 doses/week. She had a daily BM. She saw some blood on the tissue. She was much happier with her bowel habit. She was off iron supplementation. No heartburn on pantoprazole 40 mg QD.  On 12/20/23, she said she decreased from pantoprazole 40 mg to 20 mg and was currently doing well on the 20 mg dosage. She had nausea with certain types of meats (pork & red meats).  On 7/16/24, she said she continued on pantoprazole 40 mg QD. She had nausea w/ certain meats. Ondansetron helped. Not taking iron. No constipation.  On 1/15/25, she said she was doing well on pantoprazole 40 mg QD. Ondansetron relieved nausea whenever she got nausea after eating red meat, pork. She now took ondansetron before eating these meats to avoid nausea. In a month, she would use 30 pills.  HPI Today, she says she is not taking iron currently. She recently started on Ozempic. No constipation on the Ozempic. Not on Miralax. No heartburn on pantoprazole 20 mg QD. Occasional nausea w/ certain foods that she takes ondansetron for.   Labs 1/17/25 - Hemoglobinopathy profile normal except hgb 10.7. Normal phenotype.  1/15/25 - CBC normal except hgb 10.8. Iron 37, iron sat 13%, TIBC 282, ferritin 177.  12/20/23 - Vit B12 1101, iron sat 15%, iron 45, TIBC 293, ferritin 250. CBC, folate all normal. 6/20/23 - CBC normal except hgb 10.6. Ferritin 321. Iron/TIBC normal (iron 48, TIBC 288, iron sat 17%). 1/4/23 - CBC normal except hgb 11.3. Ferritin 308, TIBC 281, iron 54, iron sat 19%. 7/12/22 - TIBC 263, UIBC 219, iron 44, iron sat 17%, ferritin 460. CBC normal (hgb 11.1). 2/24/22 - CBC, CMP all normal. 2/3/22 - Ferritin 694, TIBC 265, UIBC 218, iron 47, iron sat 18%. CBC normal (hgb 11.9). 8/5/21 - Iron sat 14%, TIBC 258, UIBC 223, iron 35, ferritin 492. CBC normal (hgb 11.5). 2/3/21 - Ferritin 547. CBC, iron/TIBC all normal. 10/28/20 - CBC normal except hgb 11. Iron sat 12%, ferritin 537. 8/14/19 - Iron sat 13%, ferritin 693. CBC normal. 3/5/18 - CBC normal, Iron sat 10%, Ferritin 308.

## 2025-07-10 LAB
ABSOLUTE BASOPHILS: 29
ABSOLUTE EOSINOPHILS: 130
ABSOLUTE LYMPHOCYTES: 3046
ABSOLUTE MONOCYTES: 432
ABSOLUTE NEUTROPHILS: 3564
BASOPHILS: 0.4
EOSINOPHILS: 1.8
FERRITIN, SERUM: 171
FOLATE, SERUM: 18.8
HEMATOCRIT: 32.8
HEMOGLOBIN: 10.8
IRON BIND.CAP.(TIBC): 274
IRON SATURATION: 12
IRON: 32
LYMPHOCYTES: 42.3
MCH: 28.8
MCHC: 32.9
MCV: 87.5
MONOCYTES: 6
MPV: 10.3
NEUTROPHILS: 49.5
PLATELET COUNT: 276
RDW: 13.8
RED BLOOD CELL COUNT: 3.75
VITAMIN B12: 950
WHITE BLOOD CELL COUNT: 7.2